# Patient Record
Sex: MALE | Race: WHITE | Employment: FULL TIME | URBAN - METROPOLITAN AREA
[De-identification: names, ages, dates, MRNs, and addresses within clinical notes are randomized per-mention and may not be internally consistent; named-entity substitution may affect disease eponyms.]

---

## 2018-06-12 ENCOUNTER — OFFICE VISIT (OUTPATIENT)
Dept: ORTHOPEDIC SURGERY | Age: 38
End: 2018-06-12

## 2018-06-12 VITALS — HEIGHT: 71 IN | BODY MASS INDEX: 23.09 KG/M2 | WEIGHT: 164.9 LBS
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
## 2018-06-12 DIAGNOSIS — M79.644 THUMB PAIN, RIGHT: ICD-10-CM

## 2018-06-12 PROCEDURE — 99213 OFFICE O/P EST LOW 20 MIN: CPT | Performed by: ORTHOPAEDIC SURGERY

## 2019-11-09 ENCOUNTER — APPOINTMENT (OUTPATIENT)
Dept: CT IMAGING | Age: 39
DRG: 390 | End: 2019-11-09
Payer: COMMERCIAL

## 2019-11-09 ENCOUNTER — HOSPITAL ENCOUNTER (INPATIENT)
Age: 39
LOS: 1 days | Discharge: HOME | DRG: 390 | End: 2019-11-10
Attending: EMERGENCY MEDICINE | Admitting: SURGERY
Payer: COMMERCIAL

## 2019-11-09 DIAGNOSIS — K56.609 SMALL BOWEL OBSTRUCTION (HCC): Primary | ICD-10-CM

## 2019-11-09 LAB
A/G RATIO: 1.4 (ref 1.1–2.2)
ALBUMIN SERPL-MCNC: 4.7 G/DL (ref 3.4–5)
ALP BLD-CCNC: 59 U/L (ref 40–129)
ALT SERPL-CCNC: 26 U/L (ref 10–40)
ANION GAP SERPL CALCULATED.3IONS-SCNC: 13 MMOL/L (ref 3–16)
AST SERPL-CCNC: 25 U/L (ref 15–37)
BASOPHILS ABSOLUTE: 0 K/UL (ref 0–0.2)
BASOPHILS RELATIVE PERCENT: 0.6 %
BILIRUB SERPL-MCNC: 0.6 MG/DL (ref 0–1)
BILIRUBIN URINE: NEGATIVE
BLOOD, URINE: NEGATIVE
BUN BLDV-MCNC: 10 MG/DL (ref 7–20)
CALCIUM SERPL-MCNC: 10.3 MG/DL (ref 8.3–10.6)
CHLORIDE BLD-SCNC: 98 MMOL/L (ref 99–110)
CLARITY: CLEAR
CO2: 26 MMOL/L (ref 21–32)
COLOR: YELLOW
CREAT SERPL-MCNC: 1.2 MG/DL (ref 0.9–1.3)
EOSINOPHILS ABSOLUTE: 0 K/UL (ref 0–0.6)
EOSINOPHILS RELATIVE PERCENT: 0.2 %
GFR AFRICAN AMERICAN: >60
GFR NON-AFRICAN AMERICAN: >60
GLOBULIN: 3.4 G/DL
GLUCOSE BLD-MCNC: 99 MG/DL (ref 70–99)
GLUCOSE URINE: NEGATIVE MG/DL
HCT VFR BLD CALC: 46.3 % (ref 40.5–52.5)
HEMOGLOBIN: 15.5 G/DL (ref 13.5–17.5)
KETONES, URINE: NEGATIVE MG/DL
LEUKOCYTE ESTERASE, URINE: NEGATIVE
LIPASE: 39 U/L (ref 13–60)
LYMPHOCYTES ABSOLUTE: 1.1 K/UL (ref 1–5.1)
LYMPHOCYTES RELATIVE PERCENT: 15.9 %
MCH RBC QN AUTO: 31.5 PG (ref 26–34)
MCHC RBC AUTO-ENTMCNC: 33.6 G/DL (ref 31–36)
MCV RBC AUTO: 93.8 FL (ref 80–100)
MICROSCOPIC EXAMINATION: NORMAL
MONOCYTES ABSOLUTE: 0.6 K/UL (ref 0–1.3)
MONOCYTES RELATIVE PERCENT: 8.6 %
NEUTROPHILS ABSOLUTE: 5.3 K/UL (ref 1.7–7.7)
NEUTROPHILS RELATIVE PERCENT: 74.7 %
NITRITE, URINE: NEGATIVE
PDW BLD-RTO: 13.4 % (ref 12.4–15.4)
PH UA: 7 (ref 5–8)
PLATELET # BLD: 161 K/UL (ref 135–450)
PMV BLD AUTO: 8.7 FL (ref 5–10.5)
POTASSIUM SERPL-SCNC: 4.3 MMOL/L (ref 3.5–5.1)
PROTEIN UA: NEGATIVE MG/DL
RBC # BLD: 4.94 M/UL (ref 4.2–5.9)
SODIUM BLD-SCNC: 137 MMOL/L (ref 136–145)
SPECIFIC GRAVITY UA: <=1.005 (ref 1–1.03)
TOTAL PROTEIN: 8.1 G/DL (ref 6.4–8.2)
URINE REFLEX TO CULTURE: NORMAL
URINE TYPE: NORMAL
UROBILINOGEN, URINE: 0.2 E.U./DL
WBC # BLD: 7.1 K/UL (ref 4–11)

## 2019-11-09 PROCEDURE — 74177 CT ABD & PELVIS W/CONTRAST: CPT

## 2019-11-09 PROCEDURE — 6360000002 HC RX W HCPCS: Performed by: PHYSICIAN ASSISTANT

## 2019-11-09 PROCEDURE — 85025 COMPLETE CBC W/AUTO DIFF WBC: CPT

## 2019-11-09 PROCEDURE — 96361 HYDRATE IV INFUSION ADD-ON: CPT

## 2019-11-09 PROCEDURE — 6360000004 HC RX CONTRAST MEDICATION: Performed by: EMERGENCY MEDICINE

## 2019-11-09 PROCEDURE — 96375 TX/PRO/DX INJ NEW DRUG ADDON: CPT

## 2019-11-09 PROCEDURE — 96365 THER/PROPH/DIAG IV INF INIT: CPT

## 2019-11-09 PROCEDURE — 80053 COMPREHEN METABOLIC PANEL: CPT

## 2019-11-09 PROCEDURE — 99285 EMERGENCY DEPT VISIT HI MDM: CPT

## 2019-11-09 PROCEDURE — 81003 URINALYSIS AUTO W/O SCOPE: CPT

## 2019-11-09 PROCEDURE — 1200000000 HC SEMI PRIVATE

## 2019-11-09 PROCEDURE — 2580000003 HC RX 258: Performed by: PHYSICIAN ASSISTANT

## 2019-11-09 PROCEDURE — 2580000003 HC RX 258: Performed by: SURGERY

## 2019-11-09 PROCEDURE — 83690 ASSAY OF LIPASE: CPT

## 2019-11-09 RX ORDER — SODIUM CHLORIDE 9 MG/ML
INJECTION, SOLUTION INTRAVENOUS CONTINUOUS
Status: DISCONTINUED | OUTPATIENT
Start: 2019-11-09 | End: 2019-11-10

## 2019-11-09 RX ORDER — SODIUM CHLORIDE 0.9 % (FLUSH) 0.9 %
10 SYRINGE (ML) INJECTION PRN
Status: DISCONTINUED | OUTPATIENT
Start: 2019-11-09 | End: 2019-11-10 | Stop reason: HOSPADM

## 2019-11-09 RX ORDER — SODIUM CHLORIDE 9 MG/ML
1000 INJECTION, SOLUTION INTRAVENOUS CONTINUOUS
Status: DISCONTINUED | OUTPATIENT
Start: 2019-11-09 | End: 2019-11-10 | Stop reason: HOSPADM

## 2019-11-09 RX ORDER — LORAZEPAM 2 MG/ML
0.5 INJECTION INTRAMUSCULAR ONCE
Status: COMPLETED | OUTPATIENT
Start: 2019-11-09 | End: 2019-11-09

## 2019-11-09 RX ORDER — LIDOCAINE HYDROCHLORIDE 20 MG/ML
15 SOLUTION OROPHARYNGEAL ONCE
Status: DISCONTINUED | OUTPATIENT
Start: 2019-11-09 | End: 2019-11-10 | Stop reason: HOSPADM

## 2019-11-09 RX ORDER — ONDANSETRON 2 MG/ML
4 INJECTION INTRAMUSCULAR; INTRAVENOUS EVERY 6 HOURS PRN
Status: DISCONTINUED | OUTPATIENT
Start: 2019-11-09 | End: 2019-11-10 | Stop reason: HOSPADM

## 2019-11-09 RX ORDER — SODIUM CHLORIDE 0.9 % (FLUSH) 0.9 %
10 SYRINGE (ML) INJECTION EVERY 12 HOURS SCHEDULED
Status: DISCONTINUED | OUTPATIENT
Start: 2019-11-09 | End: 2019-11-10 | Stop reason: HOSPADM

## 2019-11-09 RX ORDER — 0.9 % SODIUM CHLORIDE 0.9 %
1000 INTRAVENOUS SOLUTION INTRAVENOUS ONCE
Status: COMPLETED | OUTPATIENT
Start: 2019-11-09 | End: 2019-11-09

## 2019-11-09 RX ADMIN — LORAZEPAM 0.5 MG: 2 INJECTION INTRAMUSCULAR; INTRAVENOUS at 12:21

## 2019-11-09 RX ADMIN — SODIUM CHLORIDE: 9 INJECTION, SOLUTION INTRAVENOUS at 19:22

## 2019-11-09 RX ADMIN — IOPAMIDOL 75 ML: 755 INJECTION, SOLUTION INTRAVENOUS at 11:31

## 2019-11-09 RX ADMIN — SODIUM CHLORIDE 1000 ML: 9 INJECTION, SOLUTION INTRAVENOUS at 12:22

## 2019-11-09 RX ADMIN — SODIUM CHLORIDE 1000 ML: 9 INJECTION, SOLUTION INTRAVENOUS at 10:46

## 2019-11-09 ASSESSMENT — PAIN DESCRIPTION - ONSET: ONSET: ON-GOING

## 2019-11-09 ASSESSMENT — PAIN DESCRIPTION - FREQUENCY: FREQUENCY: CONTINUOUS

## 2019-11-09 ASSESSMENT — PAIN DESCRIPTION - PAIN TYPE: TYPE: ACUTE PAIN

## 2019-11-09 ASSESSMENT — PAIN SCALES - GENERAL
PAINLEVEL_OUTOF10: 0
PAINLEVEL_OUTOF10: 5

## 2019-11-09 ASSESSMENT — PAIN DESCRIPTION - LOCATION: LOCATION: ABDOMEN

## 2019-11-10 ENCOUNTER — APPOINTMENT (OUTPATIENT)
Dept: GENERAL RADIOLOGY | Age: 39
DRG: 390 | End: 2019-11-10
Payer: COMMERCIAL

## 2019-11-10 VITALS
WEIGHT: 180 LBS | RESPIRATION RATE: 16 BRPM | BODY MASS INDEX: 24.38 KG/M2 | HEART RATE: 83 BPM | HEIGHT: 72 IN | OXYGEN SATURATION: 100 % | DIASTOLIC BLOOD PRESSURE: 73 MMHG | SYSTOLIC BLOOD PRESSURE: 124 MMHG | TEMPERATURE: 98.4 F

## 2019-11-10 PROBLEM — K56.609 SBO (SMALL BOWEL OBSTRUCTION) (HCC): Status: RESOLVED | Noted: 2019-11-09 | Resolved: 2019-11-10

## 2019-11-10 LAB
ANION GAP SERPL CALCULATED.3IONS-SCNC: 12 MMOL/L (ref 3–16)
BASOPHILS ABSOLUTE: 0 K/UL (ref 0–0.2)
BASOPHILS RELATIVE PERCENT: 0.6 %
BUN BLDV-MCNC: 8 MG/DL (ref 7–20)
CALCIUM SERPL-MCNC: 9.3 MG/DL (ref 8.3–10.6)
CHLORIDE BLD-SCNC: 103 MMOL/L (ref 99–110)
CO2: 25 MMOL/L (ref 21–32)
CREAT SERPL-MCNC: 1.2 MG/DL (ref 0.9–1.3)
EOSINOPHILS ABSOLUTE: 0 K/UL (ref 0–0.6)
EOSINOPHILS RELATIVE PERCENT: 0.6 %
GFR AFRICAN AMERICAN: >60
GFR NON-AFRICAN AMERICAN: >60
GLUCOSE BLD-MCNC: 96 MG/DL (ref 70–99)
HCT VFR BLD CALC: 43 % (ref 40.5–52.5)
HEMOGLOBIN: 14.1 G/DL (ref 13.5–17.5)
LYMPHOCYTES ABSOLUTE: 1.6 K/UL (ref 1–5.1)
LYMPHOCYTES RELATIVE PERCENT: 29.8 %
MCH RBC QN AUTO: 31.2 PG (ref 26–34)
MCHC RBC AUTO-ENTMCNC: 32.8 G/DL (ref 31–36)
MCV RBC AUTO: 95.1 FL (ref 80–100)
MONOCYTES ABSOLUTE: 0.5 K/UL (ref 0–1.3)
MONOCYTES RELATIVE PERCENT: 10.5 %
NEUTROPHILS ABSOLUTE: 3 K/UL (ref 1.7–7.7)
NEUTROPHILS RELATIVE PERCENT: 58.5 %
PDW BLD-RTO: 13.4 % (ref 12.4–15.4)
PLATELET # BLD: 146 K/UL (ref 135–450)
PMV BLD AUTO: 9.1 FL (ref 5–10.5)
POTASSIUM SERPL-SCNC: 4.1 MMOL/L (ref 3.5–5.1)
RBC # BLD: 4.52 M/UL (ref 4.2–5.9)
SODIUM BLD-SCNC: 140 MMOL/L (ref 136–145)
WBC # BLD: 5.2 K/UL (ref 4–11)

## 2019-11-10 PROCEDURE — 99223 1ST HOSP IP/OBS HIGH 75: CPT | Performed by: SURGERY

## 2019-11-10 PROCEDURE — 36415 COLL VENOUS BLD VENIPUNCTURE: CPT

## 2019-11-10 PROCEDURE — 80048 BASIC METABOLIC PNL TOTAL CA: CPT

## 2019-11-10 PROCEDURE — 85025 COMPLETE CBC W/AUTO DIFF WBC: CPT

## 2019-11-10 PROCEDURE — 74019 RADEX ABDOMEN 2 VIEWS: CPT

## 2019-11-10 ASSESSMENT — PAIN SCALES - GENERAL: PAINLEVEL_OUTOF10: 0

## 2020-04-16 ENCOUNTER — HOSPITAL ENCOUNTER (INPATIENT)
Age: 40
LOS: 3 days | Discharge: HOME | DRG: 390 | End: 2020-04-20
Attending: EMERGENCY MEDICINE | Admitting: SURGERY
Payer: COMMERCIAL

## 2020-04-16 LAB
A/G RATIO: 1.7 (ref 1.1–2.2)
ALBUMIN SERPL-MCNC: 4.8 G/DL (ref 3.4–5)
ALP BLD-CCNC: 61 U/L (ref 40–129)
ALT SERPL-CCNC: 19 U/L (ref 10–40)
ANION GAP SERPL CALCULATED.3IONS-SCNC: 14 MMOL/L (ref 3–16)
AST SERPL-CCNC: 31 U/L (ref 15–37)
BASOPHILS ABSOLUTE: 0 K/UL (ref 0–0.2)
BASOPHILS RELATIVE PERCENT: 0.5 %
BILIRUB SERPL-MCNC: 0.6 MG/DL (ref 0–1)
BUN BLDV-MCNC: 15 MG/DL (ref 7–20)
CALCIUM SERPL-MCNC: 10 MG/DL (ref 8.3–10.6)
CHLORIDE BLD-SCNC: 95 MMOL/L (ref 99–110)
CO2: 26 MMOL/L (ref 21–32)
CREAT SERPL-MCNC: 1.2 MG/DL (ref 0.9–1.3)
EOSINOPHILS ABSOLUTE: 0.1 K/UL (ref 0–0.6)
EOSINOPHILS RELATIVE PERCENT: 1.9 %
GFR AFRICAN AMERICAN: >60
GFR NON-AFRICAN AMERICAN: >60
GLOBULIN: 2.9 G/DL
GLUCOSE BLD-MCNC: 92 MG/DL (ref 70–99)
HCT VFR BLD CALC: 44.9 % (ref 40.5–52.5)
HEMOGLOBIN: 15.5 G/DL (ref 13.5–17.5)
LIPASE: 40 U/L (ref 13–60)
LYMPHOCYTES ABSOLUTE: 2.2 K/UL (ref 1–5.1)
LYMPHOCYTES RELATIVE PERCENT: 38.2 %
MCH RBC QN AUTO: 31.7 PG (ref 26–34)
MCHC RBC AUTO-ENTMCNC: 34.6 G/DL (ref 31–36)
MCV RBC AUTO: 91.8 FL (ref 80–100)
MONOCYTES ABSOLUTE: 0.6 K/UL (ref 0–1.3)
MONOCYTES RELATIVE PERCENT: 11.1 %
NEUTROPHILS ABSOLUTE: 2.7 K/UL (ref 1.7–7.7)
NEUTROPHILS RELATIVE PERCENT: 48.3 %
PDW BLD-RTO: 12.6 % (ref 12.4–15.4)
PLATELET # BLD: 151 K/UL (ref 135–450)
PMV BLD AUTO: 9.5 FL (ref 5–10.5)
POTASSIUM SERPL-SCNC: 3.7 MMOL/L (ref 3.5–5.1)
RBC # BLD: 4.89 M/UL (ref 4.2–5.9)
SODIUM BLD-SCNC: 135 MMOL/L (ref 136–145)
TOTAL PROTEIN: 7.7 G/DL (ref 6.4–8.2)
WBC # BLD: 5.6 K/UL (ref 4–11)

## 2020-04-16 PROCEDURE — 2580000003 HC RX 258: Performed by: PHYSICIAN ASSISTANT

## 2020-04-16 PROCEDURE — 80053 COMPREHEN METABOLIC PANEL: CPT

## 2020-04-16 PROCEDURE — 96374 THER/PROPH/DIAG INJ IV PUSH: CPT

## 2020-04-16 PROCEDURE — 83690 ASSAY OF LIPASE: CPT

## 2020-04-16 PROCEDURE — 6360000002 HC RX W HCPCS: Performed by: PHYSICIAN ASSISTANT

## 2020-04-16 PROCEDURE — 85025 COMPLETE CBC W/AUTO DIFF WBC: CPT

## 2020-04-16 PROCEDURE — 99285 EMERGENCY DEPT VISIT HI MDM: CPT

## 2020-04-16 RX ORDER — ONDANSETRON 2 MG/ML
4 INJECTION INTRAMUSCULAR; INTRAVENOUS ONCE
Status: DISCONTINUED | OUTPATIENT
Start: 2020-04-16 | End: 2020-04-17

## 2020-04-16 RX ORDER — 0.9 % SODIUM CHLORIDE 0.9 %
1000 INTRAVENOUS SOLUTION INTRAVENOUS ONCE
Status: COMPLETED | OUTPATIENT
Start: 2020-04-16 | End: 2020-04-17

## 2020-04-16 RX ADMIN — SODIUM CHLORIDE 1000 ML: 9 INJECTION, SOLUTION INTRAVENOUS at 23:18

## 2020-04-16 ASSESSMENT — PAIN DESCRIPTION - LOCATION: LOCATION: ABDOMEN

## 2020-04-16 ASSESSMENT — PAIN DESCRIPTION - PAIN TYPE: TYPE: ACUTE PAIN

## 2020-04-16 ASSESSMENT — PAIN SCALES - GENERAL: PAINLEVEL_OUTOF10: 8

## 2020-04-17 ENCOUNTER — APPOINTMENT (OUTPATIENT)
Dept: GENERAL RADIOLOGY | Age: 40
DRG: 390 | End: 2020-04-17
Payer: COMMERCIAL

## 2020-04-17 ENCOUNTER — APPOINTMENT (OUTPATIENT)
Dept: CT IMAGING | Age: 40
DRG: 390 | End: 2020-04-17
Payer: COMMERCIAL

## 2020-04-17 PROBLEM — K56.609 SBO (SMALL BOWEL OBSTRUCTION) (HCC): Status: ACTIVE | Noted: 2020-04-17

## 2020-04-17 LAB
BILIRUBIN URINE: NEGATIVE
BLOOD, URINE: NEGATIVE
CLARITY: CLEAR
COLOR: YELLOW
GLUCOSE URINE: NEGATIVE MG/DL
KETONES, URINE: 15 MG/DL
LEUKOCYTE ESTERASE, URINE: NEGATIVE
MICROSCOPIC EXAMINATION: ABNORMAL
NITRITE, URINE: NEGATIVE
PH UA: 7 (ref 5–8)
PROTEIN UA: NEGATIVE MG/DL
SPECIFIC GRAVITY UA: 1.01 (ref 1–1.03)
URINE REFLEX TO CULTURE: ABNORMAL
URINE TYPE: ABNORMAL
UROBILINOGEN, URINE: 0.2 E.U./DL

## 2020-04-17 PROCEDURE — 74177 CT ABD & PELVIS W/CONTRAST: CPT

## 2020-04-17 PROCEDURE — 99222 1ST HOSP IP/OBS MODERATE 55: CPT | Performed by: SURGERY

## 2020-04-17 PROCEDURE — 6360000004 HC RX CONTRAST MEDICATION: Performed by: EMERGENCY MEDICINE

## 2020-04-17 PROCEDURE — 81003 URINALYSIS AUTO W/O SCOPE: CPT

## 2020-04-17 PROCEDURE — 1200000000 HC SEMI PRIVATE

## 2020-04-17 PROCEDURE — 2580000003 HC RX 258: Performed by: SURGERY

## 2020-04-17 PROCEDURE — 2580000003 HC RX 258: Performed by: EMERGENCY MEDICINE

## 2020-04-17 PROCEDURE — 74019 RADEX ABDOMEN 2 VIEWS: CPT

## 2020-04-17 RX ORDER — HEPARIN SODIUM 5000 [USP'U]/ML
5000 INJECTION, SOLUTION INTRAVENOUS; SUBCUTANEOUS EVERY 8 HOURS SCHEDULED
Status: DISCONTINUED | OUTPATIENT
Start: 2020-04-17 | End: 2020-04-20 | Stop reason: HOSPADM

## 2020-04-17 RX ORDER — SODIUM CHLORIDE 9 MG/ML
1000 INJECTION, SOLUTION INTRAVENOUS ONCE
Status: COMPLETED | OUTPATIENT
Start: 2020-04-17 | End: 2020-04-17

## 2020-04-17 RX ORDER — SODIUM CHLORIDE 9 MG/ML
INJECTION, SOLUTION INTRAVENOUS CONTINUOUS
Status: DISCONTINUED | OUTPATIENT
Start: 2020-04-17 | End: 2020-04-20 | Stop reason: HOSPADM

## 2020-04-17 RX ORDER — DIPHENHYDRAMINE HYDROCHLORIDE 50 MG/ML
25 INJECTION INTRAMUSCULAR; INTRAVENOUS EVERY 6 HOURS PRN
Status: DISCONTINUED | OUTPATIENT
Start: 2020-04-17 | End: 2020-04-20 | Stop reason: HOSPADM

## 2020-04-17 RX ORDER — ONDANSETRON 2 MG/ML
4 INJECTION INTRAMUSCULAR; INTRAVENOUS EVERY 4 HOURS PRN
Status: DISCONTINUED | OUTPATIENT
Start: 2020-04-17 | End: 2020-04-20 | Stop reason: HOSPADM

## 2020-04-17 RX ADMIN — SODIUM CHLORIDE 1000 ML: 9 INJECTION, SOLUTION INTRAVENOUS at 00:47

## 2020-04-17 RX ADMIN — IOPAMIDOL 75 ML: 755 INJECTION, SOLUTION INTRAVENOUS at 01:48

## 2020-04-17 RX ADMIN — SODIUM CHLORIDE: 9 INJECTION, SOLUTION INTRAVENOUS at 13:38

## 2020-04-17 RX ADMIN — SODIUM CHLORIDE: 9 INJECTION, SOLUTION INTRAVENOUS at 20:30

## 2020-04-17 RX ADMIN — IOHEXOL 50 ML: 240 INJECTION, SOLUTION INTRATHECAL; INTRAVASCULAR; INTRAVENOUS; ORAL at 00:47

## 2020-04-17 RX ADMIN — SODIUM CHLORIDE: 9 INJECTION, SOLUTION INTRAVENOUS at 05:40

## 2020-04-17 ASSESSMENT — PAIN SCALES - GENERAL: PAINLEVEL_OUTOF10: 0

## 2020-04-17 NOTE — ED PROVIDER NOTES
He has not tried any medicine for his symptoms at home. Nursing Notes were all reviewed and agreed with or any disagreements were addressed  in the HPI. REVIEW OF SYSTEMS    (2-9 systems for level 4, 10 or more for level 5)     Review of Systems    Positives and Pertinent negatives as per HPI. Except as noted abovein the ROS, all other systems were reviewed and negative. PAST MEDICAL HISTORY     Past Medical History:   Diagnosis Date    Bowel obstruction ([address]          SURGICAL HISTORY     Past Surgical History:   Procedure Laterality Date    ABDOMEN SURGERY      SHOULDER ARTHROPLASTY Left 2015    STOMACH SURGERY      TONSILLECTOMY      WISDOM TOOTH EXTRACTION           CURRENTMEDICATIONS       Previous Medications    No medications on file         ALLERGIES     Patient has no known allergies. FAMILYHISTORY     History reviewed. No pertinent family history. SOCIAL HISTORY       Social History     Socioeconomic History    Marital status: Single     Spouse name: None    Number of children: None    Years of education: None    Highest education level: None   Occupational History    None   Social Needs    Financial resource strain: None    Food insecurity     Worry: None     Inability: None    Transportation needs     Medical: None     Non-medical: None   Tobacco Use    Smoking status: Former Smoker     Last attempt to quit: 2013     Years since quittin.3    Smokeless tobacco: Never Used   Substance and Sexual Activity    Alcohol use:  Yes     Alcohol/week: 5.0 standard drinks     Types: 5 Cans of beer per week     Comment: per week    Drug use: No    Sexual activity: None   Lifestyle    Physical activity     Days per week: None     Minutes per session: None    Stress: None   Relationships    Social connections     Talks on phone: None     Gets together: None     Attends Voodoo service: None     Active member of club or organization: None     Attends meetings of clubs or organizations: None     Relationship status: None    Intimate partner violence     Fear of current or ex partner: None     Emotionally abused: None     Physically abused: None     Forced sexual activity: None   Other Topics Concern    None   Social History Narrative    None       SCREENINGS    Marsha Coma Scale  Eye Opening: Spontaneous  Best Verbal Response: Oriented  Best Motor Response: Obeys commands  Marsha Coma Scale Score: 15        PHYSICAL EXAM    (up to 7 for level 4, 8 or more for level 5)     ED Triage Vitals   BP Temp Temp Source Pulse Resp SpO2 Height Weight   04/16/20 2243 04/16/20 2243 04/16/20 2241 04/16/20 2243 04/16/20 2243 04/16/20 2243 04/16/20 2241 04/16/20 2241   (!) 151/97 97.8 °F (36.6 °C) Oral 70 16 100 % 6' (1.829 m) 180 lb (81.6 kg)       Physical Exam  PHYSICAL EXAM  BP (!) 142/92   Pulse 65   Temp 97.8 °F (36.6 °C)   Resp 16   Ht 6' (1.829 m)   Wt 180 lb (81.6 kg)   SpO2 98%   BMI 24.41 kg/m²   GENERAL APPEARANCE: Awake and alert. Cooperative. Adult male lying supine in exam bed. He is nondiaphoretic breathing comfortably on room air showing no sign of acute respiratory distress. HEAD: Normocephalic. Atraumatic. EYES: PERRL. EOM's grossly intact. ENT: Mucous membranes are moist.   NECK: Supple. Trachea midline  HEART: RRR. No murmurs. LUNGS: Respirations unlabored. CTAB. Good air exchange. Speaking comfortably in full sentences. ABDOMEN: Soft, surgical scars noted. He has right lower quadrant tenderness to palpation, no peritoneal signs. Hyperactive bowel sounds noted throughout. Non-distended. Non-tender. No masses. No organomegaly. No guarding or rebound. EXTREMITIES: No peripheral edema. Moves all extremities equally. All extremities neurovascularly intact. SKIN: Warm and dry. No acute rashes. NEUROLOGICAL: Alert and oriented. No gross facial drooping.   Power intact upper and lower extremities, sensation intact x4   pSYCHIATRIC: Normal mood (!) 151/97 (!) 148/96 (!) 142/92   Pulse:  70 68 65   Resp:  16 14 16   Temp:  97.8 °F (36.6 °C)     TempSrc: Oral      SpO2:  100% 98% 98%   Weight: 180 lb (81.6 kg)      Height: 6' (1.829 m)          Patient was given thefollowing medications:  Medications   ondansetron (ZOFRAN) injection 4 mg (0 mg Intravenous Held 4/16/20 2318)   0.9 % sodium chloride bolus (0 mLs Intravenous Stopped 4/17/20 0034)   iopamidol (ISOVUE-370) 76 % injection 75 mL (75 mLs Intravenous Given 4/17/20 0148)   0.9 % sodium chloride infusion (1,000 mLs Intravenous New Bag 4/17/20 0047)   iohexol (OMNIPAQUE 240) injection 50 mL (50 mLs Intravenous Given 4/17/20 0047)       Patient is a 65-year-old male who presents for evaluation of abdominal pain. Patient is significant past medical history of small bowel obstruction. His abdomen is soft however he does have some right lower quadrant tenderness to palpation no other peritoneal signs. He does have hyperactive bowel sounds. Labs reviewed, no significant acute abnormality. CT abdomen pelvis with p.o. and IV contrast pending at time of signout. IV fluids, antiemetic initiated. 1:58 AM: I discussed the history, physical, and treatment plan with Dr. Liliana Orlando. Pearl Nugent was signed out in stable condition. Please see Dr. Naresh SIMMONS's note for further details, including diagnosis and disposition. FINAL IMPRESSION      1. Right lower quadrant abdominal pain          DISPOSITION/PLAN   DISPOSITION        PATIENT REFERREDTO:  No follow-up provider specified.     DISCHARGE MEDICATIONS:  New Prescriptions    No medications on file       DISCONTINUED MEDICATIONS:  Discontinued Medications    No medications on file              (Please note that portions ofthis note were completed with a voice recognition program.  Efforts were made to edit the dictations but occasionally words are mis-transcribed.)    DAYDAY Rodriges (electronically signed)       Jefry Lo

## 2020-04-17 NOTE — ED NOTES
In to talk with pt at this time. Stated that the on-call surgeon had put in an order for NG tube placement. Pt states that he does not want the NG tube at this time because he is not having n/v/. He states that if it begins to get worse, he will allow NG tube placement but states \"I do not see the need for it at the moment\".      Thom Jon RN  04/17/20 9924

## 2020-04-17 NOTE — CARE COORDINATION
Review of chart for any potential discharge needs. No needs identified for discharge intervention at this time. MD and bedside RN  if needs arise please consult case management for discharge intervention. CM not following at this time.

## 2020-04-17 NOTE — H&P
Surgery History and Physical  Idalia Fabiana Valentine, CNP  Pt Name: Mahad Smith  MRN: [id number]  YOB: 1980  Date of evaluation: 4/17/2020  Primary Care Physician: Mara Salmeron MD  Patient evaluated at the request of  Dr. Ari Navarrete  Reason for evaluation: SBO  IMPRESSIONS:   1. F/U abd xray this am: contrast in the colon, dilated mid/lower loops of SB focal ileus vs PSBO  2. CT abd and pelvis: SBO with transition point involving the distal ileum  3. ABD: soft, no distention, no N/V, + flatus, no BM  4. Labs unremarkable  5. VSS  6. Active Problems:  7. SBO (small bowel obstruction) ([address]  8. Resolved Problems:  9.   * No resolved hospital problems. *  10. PLANS:   1. Admit type: Inpatient  2. It is expected this patient's LOS will be: Less than 2 midnights  3. Anticipated Disposition Upon Discharge: Home  4. Analgesics and antiemetics on a prn basis  5. Empiric antibiotic coverage  6. DVT prophylaxis with heparin sub q  7. Clear liquids  8. If patient tolerates clear liquids and pain remains resolved the patient may be able to be discharged in the am.    SUBJECTIVE:     History of Chief Complaint:    Mahad Smith is a 42 y.o. male who presented to the ER with severe right lower quadrant abdominal pain. Yesterday morning the patient felt fine. 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 He ate lunch without any problems. The patient ate dinner at 2030. The patient states he ate a 1 pound pork. He reports feeling \"very full. \" He tried to go to sleep and developed severe epigastric pain. The pain moved to his right lower quadrant. He was nauseated. He did not vomit. He denied fever or chills. He has had bowel obstructions in the past. He states this felt \"exactly the same\" as previous bowel obstructions. A CT was performed in the ER and demonstrated the above findings. We admitted the patient for nasogastric decompression, intravenous fluids and serial abdominal examinations.      Pt has been admitted in the past for BHASKAR which have resolved without surgical intervention:     11/10/19: SBO resolved with conservative treatment. 7/21/14: SBO resolved with conservative tx  2015 x2: BHASKAR resolved with conservative treatment  ABD surgery history:   Intussusception at 1 months old with ostomy and takedown later. ISRRAEL surgery at age [de-identified]      Past Medical History   has a past medical history of Bowel obstruction ([address] Past Surgical History   has a past surgical history that includes Stomach surgery; Abdomen surgery; [place] tooth extraction; Tonsillectomy; and Total shoulder arthroplasty (Left, 2015). Medications  Prior to Admission medications    Not on File    Scheduled Meds:   heparin (porcine)  5,000 Units Subcutaneous 3 times per day     Continuous Infusions:   sodium chloride 125 mL/hr at 04/17/20 1338     PRN Meds:. HYDROmorphone, ondansetron, diphenhydrAMINE, phenol    Allergies  has No Known Allergies. Family History  family history is not on file. Social History   reports that he quit smoking about 6 years ago. He has never used smokeless tobacco. He reports current alcohol use of about 5.0 standard drinks of alcohol per week. He reports that he does not use drugs. Review of Systems:  General Denies any fever or chills  HEENT Denies any diplopia, tinnitus or vertigo  Resp Denies any shortness of breath, cough or wheezing  Cardiac Denies any chest pain, palpitations, claudication or edema  GI Positive for nausea and RLQ abdominal pain  Denies any melena, hematochezia, hematemesis or pyrosis   Denies any frequency, urgency, hesitancy or incontinence  Heme Denies bruising or bleeding easily  Endocrine Denies any history of diabetes or thyroid disease  Neuro Denies any focal motor or sensory deficits    OBJECTIVE:   VITALS:  height is 6' (1.829 m) and weight is 188 lb 9.6 oz (85.5 kg). His oral temperature is 97.8 °F (36.6 °C). His blood pressure is 126/82 and his pulse is 68.  His respiration is 14 and

## 2020-04-17 NOTE — FLOWSHEET NOTE
04/17/20 0945   Vital Signs   Temp 98.2 °F (36.8 °C)   Temp Source Oral   Pulse 66   Resp 14   /75   BP Location Right upper arm   BP Upper/Lower Upper   Patient Position Supine   Level of Consciousness 0   MEWS Score 0   Patient Currently in Pain Denies   Oxygen Therapy   SpO2 100 %   O2 Device None (Room air)     Patient went for repeat abdominal xray this morning. It still shows the partial small bowel obstruction. Patient is relaxed in bed denies any nausea or pain. Per pt he is passing gas. Pt was also able to have bowel movement early in the morning today. Patient is independent and walks steady with no complications. Remains with Iv fluids and Npo. Call light in reach.

## 2020-04-18 ENCOUNTER — APPOINTMENT (OUTPATIENT)
Dept: GENERAL RADIOLOGY | Age: 40
DRG: 390 | End: 2020-04-18
Payer: COMMERCIAL

## 2020-04-18 PROCEDURE — 74019 RADEX ABDOMEN 2 VIEWS: CPT

## 2020-04-18 PROCEDURE — 2580000003 HC RX 258: Performed by: SURGERY

## 2020-04-18 PROCEDURE — 99232 SBSQ HOSP IP/OBS MODERATE 35: CPT | Performed by: SURGERY

## 2020-04-18 PROCEDURE — 1200000000 HC SEMI PRIVATE

## 2020-04-18 PROCEDURE — 6370000000 HC RX 637 (ALT 250 FOR IP): Performed by: SURGERY

## 2020-04-18 PROCEDURE — 71045 X-RAY EXAM CHEST 1 VIEW: CPT

## 2020-04-18 PROCEDURE — 6360000002 HC RX W HCPCS: Performed by: SURGERY

## 2020-04-18 RX ORDER — LORAZEPAM 2 MG/ML
1 INJECTION INTRAMUSCULAR ONCE
Status: COMPLETED | OUTPATIENT
Start: 2020-04-18 | End: 2020-04-18

## 2020-04-18 RX ORDER — PSEUDOEPHEDRINE HYDROCHLORIDE 30 MG/1
60 TABLET ORAL EVERY 4 HOURS PRN
Status: DISCONTINUED | OUTPATIENT
Start: 2020-04-18 | End: 2020-04-20 | Stop reason: HOSPADM

## 2020-04-18 RX ORDER — ACETAMINOPHEN 325 MG/1
650 TABLET ORAL EVERY 6 HOURS PRN
Status: DISCONTINUED | OUTPATIENT
Start: 2020-04-18 | End: 2020-04-20 | Stop reason: HOSPADM

## 2020-04-18 RX ADMIN — SODIUM CHLORIDE: 9 INJECTION, SOLUTION INTRAVENOUS at 12:10

## 2020-04-18 RX ADMIN — SODIUM CHLORIDE: 9 INJECTION, SOLUTION INTRAVENOUS at 20:16

## 2020-04-18 RX ADMIN — SODIUM CHLORIDE: 9 INJECTION, SOLUTION INTRAVENOUS at 04:19

## 2020-04-18 RX ADMIN — LORAZEPAM 1 MG: 2 INJECTION INTRAMUSCULAR; INTRAVENOUS at 15:57

## 2020-04-18 RX ADMIN — PSEUDOEPHEDRINE HCL 60 MG: 30 TABLET, FILM COATED ORAL at 14:20

## 2020-04-18 RX ADMIN — ACETAMINOPHEN 650 MG: 325 TABLET ORAL at 05:59

## 2020-04-18 RX ADMIN — SODIUM CHLORIDE: 9 INJECTION, SOLUTION INTRAVENOUS at 09:51

## 2020-04-18 ASSESSMENT — PAIN SCALES - GENERAL
PAINLEVEL_OUTOF10: 0
PAINLEVEL_OUTOF10: 2

## 2020-04-18 ASSESSMENT — PAIN DESCRIPTION - LOCATION: LOCATION: HEAD

## 2020-04-18 ASSESSMENT — PAIN DESCRIPTION - PAIN TYPE: TYPE: ACUTE PAIN

## 2020-04-18 NOTE — PLAN OF CARE
Problem: Activity:  Goal: Risk for activity intolerance will decrease  Description: Risk for activity intolerance will decrease  4/17/2020 1327 by Kaylee Lau RN  Outcome: Met This Shift     Problem:  Bowel/Gastric:  Goal: Bowel function will improve  Description: Bowel function will improve  4/18/2020 0112 by Madeline Tobar RN  Outcome: Ongoing  4/17/2020 1327 by Kaylee Lau RN  Outcome: Ongoing  Goal: Diagnostic test results will improve  Description: Diagnostic test results will improve  4/18/2020 0112 by Madeline Tobar RN  Outcome: Ongoing  4/17/2020 1327 by Kaylee Lau RN  Outcome: Ongoing  Goal: Occurrences of nausea will decrease  Description: Occurrences of nausea will decrease  4/18/2020 0112 by Madeline Tobar RN  Outcome: Ongoing  4/17/2020 1327 by Kaylee Lau RN  Outcome: Met This Shift  Goal: Occurrences of vomiting will decrease  Description: Occurrences of vomiting will decrease  4/18/2020 0112 by Madeline Tobar RN  Outcome: Ongoing  4/17/2020 1327 by Kaylee Lau RN  Outcome: Met This Shift     Problem: Fluid Volume:  Goal: Maintenance of adequate hydration will improve  Description: Maintenance of adequate hydration will improve  4/17/2020 1327 by Kaylee Lau RN  Outcome: Ongoing

## 2020-04-18 NOTE — PROGRESS NOTES
One time dose of ativan was given to patient. Pt received the 1mg of ativan as was ordered. While doing the partial waste prior to administering to patient accidentally discarded the vial to scan. emi VALDERRAMA was present and witnessed the waste as well as accidentally throwing away the vial and witnessed the administration of the 1mg of ativan. JANNIE Domínguez Verifies that waste was done and vial discarded. Bar code unavailable at time of dose being administered.

## 2020-04-19 ENCOUNTER — APPOINTMENT (OUTPATIENT)
Dept: GENERAL RADIOLOGY | Age: 40
DRG: 390 | End: 2020-04-19
Payer: COMMERCIAL

## 2020-04-19 LAB
ANION GAP SERPL CALCULATED.3IONS-SCNC: 16 MMOL/L (ref 3–16)
BASOPHILS ABSOLUTE: 0 K/UL (ref 0–0.2)
BASOPHILS RELATIVE PERCENT: 0.3 %
BUN BLDV-MCNC: 6 MG/DL (ref 7–20)
CALCIUM SERPL-MCNC: 9.1 MG/DL (ref 8.3–10.6)
CHLORIDE BLD-SCNC: 103 MMOL/L (ref 99–110)
CO2: 21 MMOL/L (ref 21–32)
CREAT SERPL-MCNC: 1 MG/DL (ref 0.9–1.3)
EOSINOPHILS ABSOLUTE: 0.1 K/UL (ref 0–0.6)
EOSINOPHILS RELATIVE PERCENT: 1.1 %
GFR AFRICAN AMERICAN: >60
GFR NON-AFRICAN AMERICAN: >60
GLUCOSE BLD-MCNC: 77 MG/DL (ref 70–99)
HCT VFR BLD CALC: 42.9 % (ref 40.5–52.5)
HEMOGLOBIN: 14.3 G/DL (ref 13.5–17.5)
LYMPHOCYTES ABSOLUTE: 1.7 K/UL (ref 1–5.1)
LYMPHOCYTES RELATIVE PERCENT: 30.9 %
MAGNESIUM: 1.9 MG/DL (ref 1.8–2.4)
MCH RBC QN AUTO: 31.3 PG (ref 26–34)
MCHC RBC AUTO-ENTMCNC: 33.5 G/DL (ref 31–36)
MCV RBC AUTO: 93.4 FL (ref 80–100)
MONOCYTES ABSOLUTE: 0.5 K/UL (ref 0–1.3)
MONOCYTES RELATIVE PERCENT: 8.8 %
NEUTROPHILS ABSOLUTE: 3.2 K/UL (ref 1.7–7.7)
NEUTROPHILS RELATIVE PERCENT: 58.9 %
PDW BLD-RTO: 12.6 % (ref 12.4–15.4)
PHOSPHORUS: 3.7 MG/DL (ref 2.5–4.9)
PLATELET # BLD: 137 K/UL (ref 135–450)
PMV BLD AUTO: 9.6 FL (ref 5–10.5)
POTASSIUM SERPL-SCNC: 3.9 MMOL/L (ref 3.5–5.1)
RBC # BLD: 4.59 M/UL (ref 4.2–5.9)
SODIUM BLD-SCNC: 140 MMOL/L (ref 136–145)
WBC # BLD: 5.5 K/UL (ref 4–11)

## 2020-04-19 PROCEDURE — 2580000003 HC RX 258: Performed by: SURGERY

## 2020-04-19 PROCEDURE — 83735 ASSAY OF MAGNESIUM: CPT

## 2020-04-19 PROCEDURE — 6370000000 HC RX 637 (ALT 250 FOR IP): Performed by: SURGERY

## 2020-04-19 PROCEDURE — 74019 RADEX ABDOMEN 2 VIEWS: CPT

## 2020-04-19 PROCEDURE — 99232 SBSQ HOSP IP/OBS MODERATE 35: CPT | Performed by: SURGERY

## 2020-04-19 PROCEDURE — 80048 BASIC METABOLIC PNL TOTAL CA: CPT

## 2020-04-19 PROCEDURE — 85025 COMPLETE CBC W/AUTO DIFF WBC: CPT

## 2020-04-19 PROCEDURE — 84100 ASSAY OF PHOSPHORUS: CPT

## 2020-04-19 PROCEDURE — 36415 COLL VENOUS BLD VENIPUNCTURE: CPT

## 2020-04-19 PROCEDURE — 1200000000 HC SEMI PRIVATE

## 2020-04-19 RX ADMIN — SODIUM CHLORIDE: 9 INJECTION, SOLUTION INTRAVENOUS at 20:50

## 2020-04-19 RX ADMIN — ACETAMINOPHEN 650 MG: 325 TABLET ORAL at 20:57

## 2020-04-19 RX ADMIN — SODIUM CHLORIDE: 9 INJECTION, SOLUTION INTRAVENOUS at 03:11

## 2020-04-19 RX ADMIN — SODIUM CHLORIDE: 9 INJECTION, SOLUTION INTRAVENOUS at 13:45

## 2020-04-19 ASSESSMENT — PAIN SCALES - GENERAL
PAINLEVEL_OUTOF10: 4
PAINLEVEL_OUTOF10: 2

## 2020-04-19 ASSESSMENT — PAIN DESCRIPTION - PAIN TYPE: TYPE: ACUTE PAIN

## 2020-04-19 ASSESSMENT — PAIN DESCRIPTION - LOCATION: LOCATION: HEAD

## 2020-04-19 NOTE — PLAN OF CARE
Problem: Activity:  Goal: Risk for activity intolerance will decrease  Description: Risk for activity intolerance will decrease  Outcome: Ongoing   Pt continues to ambulate in room. Problem:  Bowel/Gastric:  Goal: Bowel function will improve  Description: Bowel function will improve  Outcome: Ongoing   Pt has repeat abd xray today that showed improvement of the SBO  Problem: Fluid Volume:  Goal: Maintenance of adequate hydration will improve  Description: Maintenance of adequate hydration will improve  Outcome: Ongoing   Pt continues to get Iv fluids and started back on clears

## 2020-04-19 NOTE — PROGRESS NOTES
Pts NG has been clamped per dr. Monique Flores orders. Can be reconnected as needed for nausea and vomiting. Patient started on clears. Pt also denied his heparin again stating he is ambulating.

## 2020-04-19 NOTE — FLOWSHEET NOTE
04/19/20 0730   Vital Signs   Temp 97.3 °F (36.3 °C)   Temp Source Oral   Pulse 71   Resp 14   BP (!) 145/76   BP Location Right upper arm   BP Upper/Lower Upper   Patient Position Semi fowlers   Level of Consciousness 0   MEWS Score 0   Patient Currently in Pain Denies   Oxygen Therapy   SpO2 100 %   O2 Device None (Room air)     Patient was up and ambulating in room urinated. Patients NG tube remains to low wall continuous suction. Starting to get more output and turning a brown color from previous clear color. Pt denies any pain this morning. Remains with Iv fluids. Pt will be having repeat abdominal xray today to see if there is any improvement of the SBO. Call light in reach. Pt denied any other needs.

## 2020-04-20 ENCOUNTER — APPOINTMENT (OUTPATIENT)
Dept: GENERAL RADIOLOGY | Age: 40
DRG: 390 | End: 2020-04-20
Payer: COMMERCIAL

## 2020-04-20 VITALS
WEIGHT: 188.6 LBS | BODY MASS INDEX: 25.55 KG/M2 | DIASTOLIC BLOOD PRESSURE: 83 MMHG | HEIGHT: 72 IN | SYSTOLIC BLOOD PRESSURE: 146 MMHG | TEMPERATURE: 98.1 F | OXYGEN SATURATION: 100 % | RESPIRATION RATE: 16 BRPM | HEART RATE: 66 BPM

## 2020-04-20 PROBLEM — K56.609 SBO (SMALL BOWEL OBSTRUCTION) (HCC): Status: RESOLVED | Noted: 2020-04-17 | Resolved: 2020-04-20

## 2020-04-20 PROCEDURE — 74019 RADEX ABDOMEN 2 VIEWS: CPT

## 2020-04-20 PROCEDURE — 6370000000 HC RX 637 (ALT 250 FOR IP): Performed by: NURSE PRACTITIONER

## 2020-04-20 PROCEDURE — 99238 HOSP IP/OBS DSCHRG MGMT 30/<: CPT | Performed by: SURGERY

## 2020-04-20 PROCEDURE — 2580000003 HC RX 258: Performed by: SURGERY

## 2020-04-20 RX ORDER — POLYETHYLENE GLYCOL 3350 17 G/17G
17 POWDER, FOR SOLUTION ORAL ONCE
Status: COMPLETED | OUTPATIENT
Start: 2020-04-20 | End: 2020-04-20

## 2020-04-20 RX ADMIN — SODIUM CHLORIDE: 9 INJECTION, SOLUTION INTRAVENOUS at 12:11

## 2020-04-20 RX ADMIN — POLYETHYLENE GLYCOL 3350 17 G: 17 POWDER, FOR SOLUTION ORAL at 12:49

## 2020-04-20 RX ADMIN — SODIUM CHLORIDE: 9 INJECTION, SOLUTION INTRAVENOUS at 04:30

## 2020-04-20 ASSESSMENT — PAIN SCALES - GENERAL: PAINLEVEL_OUTOF10: 0

## 2020-04-20 NOTE — PROGRESS NOTES
Bedside report given to [facility]  pt in stable condition no needs at this time.  Call light within reach

## 2020-04-20 NOTE — PLAN OF CARE
Problem: Activity:  Goal: Risk for activity intolerance will decrease  Description: Risk for activity intolerance will decrease  4/19/2020 1356 by Martin Latif RN  Outcome: Ongoing     Problem:  Bowel/Gastric:  Goal: Bowel function will improve  Description: Bowel function will improve  4/19/2020 2210 by Adolfo Villegas RN  Outcome: Ongoing  4/19/2020 1356 by Martin Latif RN  Outcome: Ongoing  Goal: Diagnostic test results will improve  Description: Diagnostic test results will improve  Outcome: Ongoing     Problem: Fluid Volume:  Goal: Maintenance of adequate hydration will improve  Description: Maintenance of adequate hydration will improve  4/19/2020 1356 by Martin Latif RN  Outcome: Ongoing

## 2020-04-20 NOTE — PROGRESS NOTES
Pt states that he had a small bowel movement that was unwitnessed by nurse. Pt tolerating food without nausea or pain. Pt wanting to go home.

## 2021-01-30 ENCOUNTER — HOSPITAL ENCOUNTER (INPATIENT)
Age: 41
LOS: 3 days | Discharge: HOME | DRG: 390 | End: 2021-02-02
Attending: EMERGENCY MEDICINE | Admitting: SURGERY
Payer: COMMERCIAL

## 2021-01-30 ENCOUNTER — APPOINTMENT (OUTPATIENT)
Dept: GENERAL RADIOLOGY | Age: 41
DRG: 390 | End: 2021-01-30
Payer: COMMERCIAL

## 2021-01-30 ENCOUNTER — APPOINTMENT (OUTPATIENT)
Dept: CT IMAGING | Age: 41
DRG: 390 | End: 2021-01-30
Payer: COMMERCIAL

## 2021-01-30 DIAGNOSIS — R10.30 LOWER ABDOMINAL PAIN: ICD-10-CM

## 2021-01-30 DIAGNOSIS — K56.609 SBO (SMALL BOWEL OBSTRUCTION) (HCC): Primary | ICD-10-CM

## 2021-01-30 DIAGNOSIS — R11.0 NAUSEA: ICD-10-CM

## 2021-01-30 LAB
A/G RATIO: 1.8 (ref 1.1–2.2)
ALBUMIN SERPL-MCNC: 4.9 G/DL (ref 3.4–5)
ALP BLD-CCNC: 58 U/L (ref 40–129)
ALT SERPL-CCNC: 18 U/L (ref 10–40)
ANION GAP SERPL CALCULATED.3IONS-SCNC: 11 MMOL/L (ref 3–16)
AST SERPL-CCNC: 22 U/L (ref 15–37)
BASOPHILS ABSOLUTE: 0 K/UL (ref 0–0.2)
BASOPHILS RELATIVE PERCENT: 0.5 %
BILIRUB SERPL-MCNC: 0.4 MG/DL (ref 0–1)
BILIRUBIN URINE: NEGATIVE
BLOOD, URINE: NEGATIVE
BUN BLDV-MCNC: 11 MG/DL (ref 7–20)
CALCIUM SERPL-MCNC: 10.3 MG/DL (ref 8.3–10.6)
CHLORIDE BLD-SCNC: 99 MMOL/L (ref 99–110)
CLARITY: CLEAR
CO2: 30 MMOL/L (ref 21–32)
COLOR: YELLOW
CREAT SERPL-MCNC: 1.1 MG/DL (ref 0.9–1.3)
EOSINOPHILS ABSOLUTE: 0.1 K/UL (ref 0–0.6)
EOSINOPHILS RELATIVE PERCENT: 1.5 %
GFR AFRICAN AMERICAN: >60
GFR NON-AFRICAN AMERICAN: >60
GLOBULIN: 2.7 G/DL
GLUCOSE BLD-MCNC: 111 MG/DL (ref 70–99)
GLUCOSE URINE: NEGATIVE MG/DL
HCT VFR BLD CALC: 43.8 % (ref 40.5–52.5)
HEMOGLOBIN: 14.9 G/DL (ref 13.5–17.5)
KETONES, URINE: NEGATIVE MG/DL
LACTIC ACID: 1.4 MMOL/L (ref 0.4–2)
LEUKOCYTE ESTERASE, URINE: NEGATIVE
LIPASE: 33 U/L (ref 13–60)
LYMPHOCYTES ABSOLUTE: 1.7 K/UL (ref 1–5.1)
LYMPHOCYTES RELATIVE PERCENT: 35 %
MCH RBC QN AUTO: 31.2 PG (ref 26–34)
MCHC RBC AUTO-ENTMCNC: 34.1 G/DL (ref 31–36)
MCV RBC AUTO: 91.5 FL (ref 80–100)
MICROSCOPIC EXAMINATION: NORMAL
MONOCYTES ABSOLUTE: 0.3 K/UL (ref 0–1.3)
MONOCYTES RELATIVE PERCENT: 6.8 %
NEUTROPHILS ABSOLUTE: 2.7 K/UL (ref 1.7–7.7)
NEUTROPHILS RELATIVE PERCENT: 56.2 %
NITRITE, URINE: NEGATIVE
PDW BLD-RTO: 12.6 % (ref 12.4–15.4)
PH UA: 8 (ref 5–8)
PLATELET # BLD: 146 K/UL (ref 135–450)
PMV BLD AUTO: 9.6 FL (ref 5–10.5)
POTASSIUM REFLEX MAGNESIUM: 3.9 MMOL/L (ref 3.5–5.1)
PROTEIN UA: NEGATIVE MG/DL
RBC # BLD: 4.78 M/UL (ref 4.2–5.9)
SODIUM BLD-SCNC: 140 MMOL/L (ref 136–145)
SPECIFIC GRAVITY UA: 1.01 (ref 1–1.03)
TOTAL PROTEIN: 7.6 G/DL (ref 6.4–8.2)
URINE REFLEX TO CULTURE: NORMAL
URINE TYPE: NORMAL
UROBILINOGEN, URINE: 0.2 E.U./DL
WBC # BLD: 4.8 K/UL (ref 4–11)

## 2021-01-30 PROCEDURE — 85025 COMPLETE CBC W/AUTO DIFF WBC: CPT

## 2021-01-30 PROCEDURE — 74018 RADEX ABDOMEN 1 VIEW: CPT

## 2021-01-30 PROCEDURE — 74019 RADEX ABDOMEN 2 VIEWS: CPT

## 2021-01-30 PROCEDURE — 6360000002 HC RX W HCPCS: Performed by: EMERGENCY MEDICINE

## 2021-01-30 PROCEDURE — 6370000000 HC RX 637 (ALT 250 FOR IP): Performed by: EMERGENCY MEDICINE

## 2021-01-30 PROCEDURE — 6360000004 HC RX CONTRAST MEDICATION: Performed by: EMERGENCY MEDICINE

## 2021-01-30 PROCEDURE — 2580000003 HC RX 258: Performed by: EMERGENCY MEDICINE

## 2021-01-30 PROCEDURE — 74177 CT ABD & PELVIS W/CONTRAST: CPT

## 2021-01-30 PROCEDURE — 2580000003 HC RX 258: Performed by: SURGERY

## 2021-01-30 PROCEDURE — 83690 ASSAY OF LIPASE: CPT

## 2021-01-30 PROCEDURE — 81003 URINALYSIS AUTO W/O SCOPE: CPT

## 2021-01-30 PROCEDURE — 96374 THER/PROPH/DIAG INJ IV PUSH: CPT

## 2021-01-30 PROCEDURE — 1200000000 HC SEMI PRIVATE

## 2021-01-30 PROCEDURE — 99284 EMERGENCY DEPT VISIT MOD MDM: CPT

## 2021-01-30 PROCEDURE — 83605 ASSAY OF LACTIC ACID: CPT

## 2021-01-30 PROCEDURE — 80053 COMPREHEN METABOLIC PANEL: CPT

## 2021-01-30 PROCEDURE — 96375 TX/PRO/DX INJ NEW DRUG ADDON: CPT

## 2021-01-30 PROCEDURE — 6360000002 HC RX W HCPCS: Performed by: SURGERY

## 2021-01-30 RX ORDER — PROMETHAZINE HYDROCHLORIDE 25 MG/ML
12.5 INJECTION, SOLUTION INTRAMUSCULAR; INTRAVENOUS ONCE
Status: COMPLETED | OUTPATIENT
Start: 2021-01-30 | End: 2021-01-30

## 2021-01-30 RX ORDER — LIDOCAINE HYDROCHLORIDE 20 MG/ML
15 SOLUTION OROPHARYNGEAL ONCE
Status: COMPLETED | OUTPATIENT
Start: 2021-01-30 | End: 2021-01-30

## 2021-01-30 RX ORDER — KETOROLAC TROMETHAMINE 30 MG/ML
15 INJECTION, SOLUTION INTRAMUSCULAR; INTRAVENOUS ONCE
Status: COMPLETED | OUTPATIENT
Start: 2021-01-30 | End: 2021-01-30

## 2021-01-30 RX ORDER — ACETAMINOPHEN 325 MG/1
650 TABLET ORAL EVERY 6 HOURS PRN
Status: DISCONTINUED | OUTPATIENT
Start: 2021-01-30 | End: 2021-02-02 | Stop reason: HOSPADM

## 2021-01-30 RX ORDER — SODIUM CHLORIDE 0.9 % (FLUSH) 0.9 %
10 SYRINGE (ML) INJECTION PRN
Status: DISCONTINUED | OUTPATIENT
Start: 2021-01-30 | End: 2021-02-02 | Stop reason: HOSPADM

## 2021-01-30 RX ORDER — 0.9 % SODIUM CHLORIDE 0.9 %
1000 INTRAVENOUS SOLUTION INTRAVENOUS ONCE
Status: COMPLETED | OUTPATIENT
Start: 2021-01-30 | End: 2021-01-30

## 2021-01-30 RX ORDER — SODIUM CHLORIDE 0.9 % (FLUSH) 0.9 %
10 SYRINGE (ML) INJECTION EVERY 12 HOURS SCHEDULED
Status: DISCONTINUED | OUTPATIENT
Start: 2021-01-30 | End: 2021-02-02 | Stop reason: HOSPADM

## 2021-01-30 RX ORDER — KETOROLAC TROMETHAMINE 30 MG/ML
30 INJECTION, SOLUTION INTRAMUSCULAR; INTRAVENOUS EVERY 6 HOURS PRN
Status: DISCONTINUED | OUTPATIENT
Start: 2021-01-30 | End: 2021-02-02 | Stop reason: HOSPADM

## 2021-01-30 RX ORDER — SODIUM CHLORIDE, SODIUM LACTATE, POTASSIUM CHLORIDE, CALCIUM CHLORIDE 600; 310; 30; 20 MG/100ML; MG/100ML; MG/100ML; MG/100ML
1000 INJECTION, SOLUTION INTRAVENOUS ONCE
Status: COMPLETED | OUTPATIENT
Start: 2021-01-30 | End: 2021-01-30

## 2021-01-30 RX ORDER — ONDANSETRON 2 MG/ML
4 INJECTION INTRAMUSCULAR; INTRAVENOUS ONCE
Status: COMPLETED | OUTPATIENT
Start: 2021-01-30 | End: 2021-01-30

## 2021-01-30 RX ORDER — ONDANSETRON 2 MG/ML
4 INJECTION INTRAMUSCULAR; INTRAVENOUS EVERY 6 HOURS PRN
Status: DISCONTINUED | OUTPATIENT
Start: 2021-01-30 | End: 2021-02-02 | Stop reason: HOSPADM

## 2021-01-30 RX ORDER — MORPHINE SULFATE 4 MG/ML
4 INJECTION, SOLUTION INTRAMUSCULAR; INTRAVENOUS ONCE
Status: COMPLETED | OUTPATIENT
Start: 2021-01-30 | End: 2021-01-30

## 2021-01-30 RX ORDER — PROMETHAZINE HYDROCHLORIDE 25 MG/1
12.5 TABLET ORAL EVERY 6 HOURS PRN
Status: DISCONTINUED | OUTPATIENT
Start: 2021-01-30 | End: 2021-02-02 | Stop reason: HOSPADM

## 2021-01-30 RX ORDER — SODIUM CHLORIDE 9 MG/ML
INJECTION, SOLUTION INTRAVENOUS CONTINUOUS
Status: DISCONTINUED | OUTPATIENT
Start: 2021-01-30 | End: 2021-02-02 | Stop reason: HOSPADM

## 2021-01-30 RX ADMIN — ONDANSETRON HYDROCHLORIDE 4 MG: 2 INJECTION, SOLUTION INTRAMUSCULAR; INTRAVENOUS at 08:24

## 2021-01-30 RX ADMIN — MORPHINE SULFATE 4 MG: 4 INJECTION, SOLUTION INTRAMUSCULAR; INTRAVENOUS at 08:25

## 2021-01-30 RX ADMIN — IOHEXOL 50 ML: 240 INJECTION, SOLUTION INTRATHECAL; INTRAVASCULAR; INTRAVENOUS; ORAL at 05:37

## 2021-01-30 RX ADMIN — SODIUM CHLORIDE: 9 INJECTION, SOLUTION INTRAVENOUS at 11:53

## 2021-01-30 RX ADMIN — LIDOCAINE HYDROCHLORIDE 15 ML: 20 SOLUTION ORAL; TOPICAL at 08:47

## 2021-01-30 RX ADMIN — SODIUM CHLORIDE, POTASSIUM CHLORIDE, SODIUM LACTATE AND CALCIUM CHLORIDE 1000 ML: 600; 310; 30; 20 INJECTION, SOLUTION INTRAVENOUS at 08:56

## 2021-01-30 RX ADMIN — SODIUM CHLORIDE: 9 INJECTION, SOLUTION INTRAVENOUS at 19:01

## 2021-01-30 RX ADMIN — KETOROLAC TROMETHAMINE 30 MG: 30 INJECTION, SOLUTION INTRAMUSCULAR; INTRAVENOUS at 19:45

## 2021-01-30 RX ADMIN — PROMETHAZINE HYDROCHLORIDE 12.5 MG: 25 INJECTION INTRAMUSCULAR; INTRAVENOUS at 04:49

## 2021-01-30 RX ADMIN — KETOROLAC TROMETHAMINE 15 MG: 30 INJECTION, SOLUTION INTRAMUSCULAR at 04:49

## 2021-01-30 RX ADMIN — IOPAMIDOL 75 ML: 755 INJECTION, SOLUTION INTRAVENOUS at 06:52

## 2021-01-30 RX ADMIN — SODIUM CHLORIDE 1000 ML: 9 INJECTION, SOLUTION INTRAVENOUS at 04:50

## 2021-01-30 ASSESSMENT — PAIN DESCRIPTION - ORIENTATION
ORIENTATION: RIGHT;LEFT
ORIENTATION: RIGHT

## 2021-01-30 ASSESSMENT — PAIN DESCRIPTION - LOCATION: LOCATION: ABDOMEN

## 2021-01-30 ASSESSMENT — PAIN DESCRIPTION - PAIN TYPE
TYPE: ACUTE PAIN
TYPE: ACUTE PAIN

## 2021-01-30 ASSESSMENT — PAIN SCALES - GENERAL
PAINLEVEL_OUTOF10: 5
PAINLEVEL_OUTOF10: 7

## 2021-01-30 NOTE — FLOWSHEET NOTE
01/30/21 1121   Vital Signs   Temp 97.8 °F (36.6 °C)   Temp Source Oral   Pulse 76   Heart Rate Source Monitor   Resp 18   /82   Level of Consciousness Alert (0)   MEWS Score 1   Patient Currently in Pain Denies   Height and Weight   Height 6' (1.829 m)   Weight 191 lb (86.6 kg)   BSA (Calculated - sq m) 2.1 sq meters   BMI (Calculated) 26   Oxygen Therapy   SpO2 98 %   O2 Device None (Room air)     Pt a/o. Admitted to 2w from ER. Ng to ws, pt denies any pain or nausea at this time. Call light within reach. Pt refused lovenox, scd's and iv pepcid. Ravi Valenzuela RN\

## 2021-01-30 NOTE — PLAN OF CARE
Problem: Activity:  Goal: Risk for activity intolerance will decrease  Description: Risk for activity intolerance will decrease  Outcome: Ongoing     Problem:  Bowel/Gastric:  Goal: Bowel function will improve  Description: Bowel function will improve  Outcome: Ongoing  Goal: Diagnostic test results will improve  Description: Diagnostic test results will improve  Outcome: Ongoing  Goal: Occurrences of nausea will decrease  Description: Occurrences of nausea will decrease  Outcome: Ongoing  Goal: Occurrences of vomiting will decrease  Description: Occurrences of vomiting will decrease  Outcome: Ongoing     Problem: Fluid Volume:  Goal: Maintenance of adequate hydration will improve  Description: Maintenance of adequate hydration will improve  Outcome: Ongoing     Problem: Health Behavior:  Goal: Ability to state signs and symptoms to report to health care provider will improve  Description: Ability to state signs and symptoms to report to health care provider will improve  Outcome: Ongoing     Problem: Physical Regulation:  Goal: Complications related to the disease process, condition or treatment will be avoided or minimized  Description: Complications related to the disease process, condition or treatment will be avoided or minimized  Outcome: Ongoing  Goal: Ability to maintain clinical measurements within normal limits will improve  Description: Ability to maintain clinical measurements within normal limits will improve  Outcome: Ongoing     Problem: Sensory:  Goal: Ability to identify factors that increase the pain will improve  Description: Ability to identify factors that increase the pain will improve  Outcome: Ongoing  Goal: Ability to notify healthcare provider of pain before it becomes unmanageable or unbearable will improve  Description: Ability to notify healthcare provider of pain before it becomes unmanageable or unbearable will improve  Outcome: Ongoing  Goal: Pain level will decrease Description: Pain level will decrease  Outcome: Ongoing

## 2021-01-30 NOTE — ED PROVIDER NOTES
CHIEF COMPLAINT  Abdominal Pain (pt states he \"thinks he has a bowel obstruction\", states he has hx of bowel obstruction, pt states last formed bm a few hours ago, pt states unable to pass gas and nausea)      HISTORY OF PRESENT ILLNESS  Savanna Bruno is a 47 y.o. male presents to the ED with abdominal pain, states \"I think I have a bowel obstruction again\", has had them before, last was April 2020, the previous before that was November 2019, saw Dr. Terrence Perkins last time, he states he really wants to get surgery this time and get it over with to get this fixed, having nausea but no vomiting, pain to start a couple hours ago, he did just have a bowel movement, which was normal, no melena or hematochezia, no fever, no headache or neck stiffness, no cough or Covid concerns, no dysuria/hematuria or difficulty with urination, no STD concerns or penile discharge, reports having bowel resection as an infant, and appendectomy at 8years old, which was since last abdominal surgery, had not taken anything for pain or nausea prior to arrival, states he had one beer last night, drinks occasionally, denies drug use, no other complaints, modifying factors or associated symptoms. I have reviewed the following from the nursing documentation. Past Medical History:   Diagnosis Date    Bowel obstruction [facility])      Past Surgical History:   Procedure Laterality Date    ABDOMEN SURGERY      SHOULDER ARTHROPLASTY Left 2015    STOMACH SURGERY      TONSILLECTOMY      WISDOM TOOTH EXTRACTION       History reviewed. No pertinent family history.   Social History     Socioeconomic History    Marital status: Single     Spouse name: Not on file    Number of children: Not on file    Years of education: masters degress    Highest education level: Not on file   Occupational History    Not on file   Social Needs    Financial resource strain: Not on file    Food insecurity     Worry: Not on file     Inability: Not on file  Transportation needs     Medical: Not on file     Non-medical: Not on file   Tobacco Use    Smoking status: Former Smoker     Quit date: 2013     Years since quittin.1    Smokeless tobacco: Never Used   Substance and Sexual Activity    Alcohol use: Yes     Alcohol/week: 5.0 standard drinks     Types: 5 Cans of beer per week     Comment: per week    Drug use: No    Sexual activity: Yes     Partners: Female   Lifestyle    Physical activity     Days per week: Not on file     Minutes per session: Not on file    Stress: Not on file   Relationships    Social connections     Talks on phone: Not on file     Gets together: Not on file     Attends Hoahaoism service: Not on file     Active member of club or organization: Not on file     Attends meetings of clubs or organizations: Not on file     Relationship status: Not on file    Intimate partner violence     Fear of current or ex partner: Not on file     Emotionally abused: Not on file     Physically abused: Not on file     Forced sexual activity: Not on file   Other Topics Concern    Not on file   Social History Narrative    Not on file     No current facility-administered medications for this encounter. No current outpatient medications on file. No Known Allergies    REVIEW OF SYSTEMS  10 systems reviewed, pertinent positives per HPI otherwise noted to be negative. PHYSICAL EXAM  /88   Pulse 73   Temp 98.1 °F (36.7 °C)   Resp 16   SpO2 100%   GENERAL APPEARANCE: Awake and alert. Cooperative. No acute distress  HEAD: Normocephalic. Atraumatic. EYES: PERRL. EOM's grossly intact. ENT: Mucous membranes are moist.  Airway patent, no stridor  NECK: Supple. No rigidity  HEART: RRR. No murmurs  LUNGS: Respirations nonlabored. Lungs are clear to auscultation bilaterally. 37-year-old male with abdominal pain, he believes he had a bowel obstruction, benign abdominal exam on arrival with only mild tenderness in lower abdomen, vitals normal except mildly elevated blood pressure, initiated fluids and meds for pain/nausea given his SBO history, made n.p.o., and he did get some relief from Toradol/Phenergan, and in effort to see the transition point better- I asked if he could tolerate oral contrast and he stated he could, however after the CT he developed more abdominal pain, and there was a large amount of contrast noted in the stomach on CT so I did feel it appropriate to initiate NG tube at this time to help relieve his pain though he was not actively vomiting, he requested lidocaine for placement of the NG tube, he stated he did not really like the way the morphine made him feel after that was given, and requested more Toradol if necessary, placed on maintenance fluids, he was updated on CT findings with similar SBO findings from 4/17/2020 with transition point at the terminal ileum/ileocecal valve, labs unremarkable, I did speak to Dr. Ashley Nurse, on-call with general surgery and he agreed to accept for admission, stated he would potentially go for surgery this week, patient verbalized understanding of plan for admission.     Orders Placed This Encounter   Procedures    CT ABDOMEN PELVIS W IV CONTRAST Additional Contrast? IV AND ORAL    XR ABDOMEN FOR NG/OG/NE TUBE PLACEMENT    CBC Auto Differential    Comprehensive Metabolic Panel w/ Reflex to MG    Lactic Acid, Plasma    Lipase    Urinalysis Reflex to Culture    Diet NPO Effective Now    Nasogastric tube insertion    Inpatient consult to General Surgery    PATIENT STATUS (FROM ED OR OR/PROCEDURAL) Inpatient     Orders Placed This Encounter   Medications    0.9 % sodium chloride bolus    promethazine (PHENERGAN) injection 12.5 mg    ketorolac (TORADOL) injection 15 mg  DISCONTD: iohexol (OMNIPAQUE 240) injection 150 mL    iohexol (OMNIPAQUE 240) injection 50 mL    iopamidol (ISOVUE-370) 76 % injection 75 mL    morphine sulfate (PF) injection 4 mg    ondansetron (ZOFRAN) injection 4 mg    lactated ringers infusion 1,000 mL    lidocaine viscous hcl (XYLOCAINE) 2 % solution 15 mL     ED Course as of Jan 30 1020   Sat Jan 30, 2021   9529 I spoke to Dr. Thanh Menjivar, he agreed to admit, will likely take to surgery this week. [SY]   1017 NGT advanced by nurse. XR ABDOMEN FOR NG/OG/NE TUBE PLACEMENT [SY]   8950 NG tube was advanced by nurse    [SY]   6158 I updated the patient on plan, explained general surgery would be seeing him at some point today and would determine future plan of care. [SY]      ED Course User Index  [SY] Otto Solo DO       CLINICAL IMPRESSION  1. SBO (small bowel obstruction) ([facility] [address]    2. Lower abdominal pain    3. Nausea        Blood pressure 129/88, pulse 73, temperature 98.1 °F (36.7 °C), resp. rate 16, SpO2 100 %. [address] was admitted in stable condition.                         Otto Solo,   01/30/21 850 [facility], DO  01/30/21 7811

## 2021-01-30 NOTE — DISCHARGE INSTR - COC
Continuity of Care Form    Patient Name: Gustavo Harrell   :  1980  MRN:  [id number]    Admit date:  2021  Discharge date:  2021    Code Status Order: Prior   Advance Directives:     Admitting Physician:  No admitting provider for patient encounter. PCP: Hermila Montesinos MD    Discharging Nurse: [place] Unit/Room#:   Discharging Unit Phone Number: [phone]    Emergency Contact:   Extended Emergency Contact Information  Primary Emergency Contact: Donna Ojeda  Address: [address] [place] [address], [address] [place] [address]  Home Phone: [phone]  Work Phone: [phone]  Mobile Phone: [phone]  Relation: Parent  Secondary Emergency Contact: Missy Romberg  Mobile Phone: [phone]  Relation: Domestic Partner   needed? No    Past Surgical History:  Past Surgical History:   Procedure Laterality Date    ABDOMEN SURGERY      SHOULDER ARTHROPLASTY Left 2015    STOMACH SURGERY      TONSILLECTOMY      WISDOM TOOTH EXTRACTION         Immunization History: There is no immunization history on file for this patient.     Active Problems:  Patient Active Problem List   Diagnosis Code    Small bowel obstruction (HCC) K56.609    Shoulder instability M25.319    Labral tear of shoulder S43.439A    Closed Hill-Sachs fracture of left humerus S42.292A    S/P arthroscopy of shoulder Z98.890       Isolation/Infection:   Isolation          No Isolation        Patient Infection Status     None to display          Nurse Assessment:  Last Vital Signs: /73   Pulse 73   Temp 98.1 °F (36.7 °C)   Resp 16   SpO2 100%     Last documented pain score (0-10 scale): Pain Level: 7  Last Weight:   Wt Readings from Last 1 Encounters:   20 188 lb 9.6 oz (85.5 kg)     Mental Status:  oriented and alert    IV Access:  - None    Nursing Mobility/ADLs:  Walking   Independent  Transfer  Independent  Uriah Hanna Feeding  Independent  Med Admin  Independent  Med Delivery   whole    Wound Care Documentation and Therapy:        Elimination:  Continence:   · Bowel: Yes  · Bladder: Yes  Urinary Catheter: None   Colostomy/Ileostomy/Ileal Conduit: No         No intake or output data in the 24 hours ending 01/30/21 0850  No intake/output data recorded. Safety Concerns:     None    Impairments/Disabilities:      None    Nutrition Therapy:  Current Nutrition Therapy:   - Oral Diet:  Full Liquid advance in two to three days    Routes of Feeding: Oral  Liquids: Thin Liquids  Daily Fluid Restriction: no  Last Modified Barium Swallow with Video (Video Swallowing Test): not done    Treatments at the Time of Hospital Discharge:   Respiratory Treatments:   Oxygen Therapy:  is not on home oxygen therapy. Ventilator:    - No ventilator support    Rehab Therapies:   Weight Bearing Status/Restrictions: No weight bearing restirctions  Other Medical Equipment (for information only, NOT a DME order):     Other Treatments:     Patient's personal belongings (please select all that are sent with patient):  None    RN SIGNATURE:  Electronically signed by David Rhodes RN on 2/2/21 at 1:18 PM EST    CASE MANAGEMENT/SOCIAL WORK SECTION    Inpatient Status Date: ***    Readmission Risk Assessment Score:  Readmission Risk              Risk of Unplanned Readmission:        0           Discharging to Facility/ Agency   · Name:   · Address:  · Phone:  · Fax:    Dialysis Facility (if applicable)   · Name:  · Address:  · Dialysis Schedule:  · Phone:  · Fax:    / signature: {Esignature:[id number]}    PHYSICIAN SECTION    Prognosis: {Prognosis:8938188288}    Condition at Discharge: Garcia Jaime Leonel Patient Condition:483465519}    Rehab Potential (if transferring to Rehab): {Prognosis:6478571638}    Recommended Labs or Other Treatments After Discharge: *** Physician Certification: I certify the above information and transfer of Neetu De La Garza  is necessary for the continuing treatment of the diagnosis listed and that he requires {Admit to Appropriate Level of Care:34135} for {GREATER/LESS:964072074} 30 days.      Update Admission H&P: {CHP DME Changes in YRRNE:252952001}    PHYSICIAN SIGNATURE:  {Esignature:780659966}

## 2021-01-30 NOTE — ED NOTES
7502 - [facility]     [facility]  01/30/21 7142 [id number] - Dr. Tran Garcia called back.      [facility]  01/30/21 3007

## 2021-01-30 NOTE — ED NOTES
Report given to Nancy VALDERRAMA. Nancy transporting pt to floor via wheelchair.      Noble Foster RN  01/30/21 2025

## 2021-01-31 ENCOUNTER — APPOINTMENT (OUTPATIENT)
Dept: GENERAL RADIOLOGY | Age: 41
DRG: 390 | End: 2021-01-31
Payer: COMMERCIAL

## 2021-01-31 LAB
ANION GAP SERPL CALCULATED.3IONS-SCNC: 5 MMOL/L (ref 3–16)
BASOPHILS ABSOLUTE: 0 K/UL (ref 0–0.2)
BASOPHILS RELATIVE PERCENT: 0.6 %
BUN BLDV-MCNC: 10 MG/DL (ref 7–20)
CALCIUM SERPL-MCNC: 8.7 MG/DL (ref 8.3–10.6)
CHLORIDE BLD-SCNC: 109 MMOL/L (ref 99–110)
CO2: 26 MMOL/L (ref 21–32)
CREAT SERPL-MCNC: 1 MG/DL (ref 0.9–1.3)
EOSINOPHILS ABSOLUTE: 0.1 K/UL (ref 0–0.6)
EOSINOPHILS RELATIVE PERCENT: 1.3 %
GFR AFRICAN AMERICAN: >60
GFR NON-AFRICAN AMERICAN: >60
GLUCOSE BLD-MCNC: 100 MG/DL (ref 70–99)
HCT VFR BLD CALC: 40.4 % (ref 40.5–52.5)
HEMOGLOBIN: 13.4 G/DL (ref 13.5–17.5)
LACTIC ACID: 0.8 MMOL/L (ref 0.4–2)
LYMPHOCYTES ABSOLUTE: 1.4 K/UL (ref 1–5.1)
LYMPHOCYTES RELATIVE PERCENT: 28.5 %
MCH RBC QN AUTO: 31.4 PG (ref 26–34)
MCHC RBC AUTO-ENTMCNC: 33.3 G/DL (ref 31–36)
MCV RBC AUTO: 94.1 FL (ref 80–100)
MONOCYTES ABSOLUTE: 0.4 K/UL (ref 0–1.3)
MONOCYTES RELATIVE PERCENT: 7.9 %
NEUTROPHILS ABSOLUTE: 3.1 K/UL (ref 1.7–7.7)
NEUTROPHILS RELATIVE PERCENT: 61.7 %
PDW BLD-RTO: 13.2 % (ref 12.4–15.4)
PLATELET # BLD: 125 K/UL (ref 135–450)
PMV BLD AUTO: 9.1 FL (ref 5–10.5)
POTASSIUM SERPL-SCNC: 3.9 MMOL/L (ref 3.5–5.1)
RBC # BLD: 4.29 M/UL (ref 4.2–5.9)
SODIUM BLD-SCNC: 140 MMOL/L (ref 136–145)
WBC # BLD: 5 K/UL (ref 4–11)

## 2021-01-31 PROCEDURE — 36415 COLL VENOUS BLD VENIPUNCTURE: CPT

## 2021-01-31 PROCEDURE — 80048 BASIC METABOLIC PNL TOTAL CA: CPT

## 2021-01-31 PROCEDURE — 2580000003 HC RX 258: Performed by: SURGERY

## 2021-01-31 PROCEDURE — 6370000000 HC RX 637 (ALT 250 FOR IP): Performed by: SURGERY

## 2021-01-31 PROCEDURE — 83605 ASSAY OF LACTIC ACID: CPT

## 2021-01-31 PROCEDURE — 1200000000 HC SEMI PRIVATE

## 2021-01-31 PROCEDURE — 6360000002 HC RX W HCPCS: Performed by: SURGERY

## 2021-01-31 PROCEDURE — 74019 RADEX ABDOMEN 2 VIEWS: CPT

## 2021-01-31 PROCEDURE — 99223 1ST HOSP IP/OBS HIGH 75: CPT | Performed by: SURGERY

## 2021-01-31 PROCEDURE — 85025 COMPLETE CBC W/AUTO DIFF WBC: CPT

## 2021-01-31 RX ADMIN — KETOROLAC TROMETHAMINE 30 MG: 30 INJECTION, SOLUTION INTRAMUSCULAR; INTRAVENOUS at 12:12

## 2021-01-31 RX ADMIN — BENZOCAINE AND MENTHOL 1 LOZENGE: 15; 2.6 LOZENGE ORAL at 19:01

## 2021-01-31 RX ADMIN — BENZOCAINE AND MENTHOL 1 LOZENGE: 15; 2.6 LOZENGE ORAL at 15:30

## 2021-01-31 RX ADMIN — SODIUM CHLORIDE: 9 INJECTION, SOLUTION INTRAVENOUS at 09:31

## 2021-01-31 RX ADMIN — BENZOCAINE AND MENTHOL 1 LOZENGE: 15; 2.6 LOZENGE ORAL at 12:03

## 2021-01-31 RX ADMIN — BENZOCAINE AND MENTHOL 1 LOZENGE: 15; 2.6 LOZENGE ORAL at 20:50

## 2021-01-31 RX ADMIN — SODIUM CHLORIDE: 9 INJECTION, SOLUTION INTRAVENOUS at 19:01

## 2021-01-31 RX ADMIN — BENZOCAINE AND MENTHOL 1 LOZENGE: 15; 2.6 LOZENGE ORAL at 09:31

## 2021-01-31 RX ADMIN — BENZOCAINE AND MENTHOL 1 LOZENGE: 15; 2.6 LOZENGE ORAL at 13:12

## 2021-01-31 RX ADMIN — Medication 1 SPRAY: at 09:31

## 2021-01-31 RX ADMIN — SODIUM CHLORIDE: 9 INJECTION, SOLUTION INTRAVENOUS at 01:40

## 2021-01-31 ASSESSMENT — PAIN DESCRIPTION - DESCRIPTORS: DESCRIPTORS: HEADACHE

## 2021-01-31 NOTE — PROGRESS NOTES
Pt requesting to \"see a Dr, this pain is bad, something isn't right, it shouldn't feel like this\". Writer sent message to Dr. Debbie Boudreaux. I just gave Toradol to pt, pt requesting to see a dr r/t \"something is not right this is sharp stabbing pain and it shouldn't be like this\". would you like to order an xray? Thanks. Spoke with Dr. Regina Preston md to order xray. Bedside report and pt care transferred to [facility]. Jennifer Knutson RN

## 2021-01-31 NOTE — PROGRESS NOTES
Pt resting. Resp e/e. Shift assessment completed and charted. Pt wanting IVF rate increased, updated pt this RN would update MD, pt verbalized understanding. No other needs. Will monitor.  Gokul Paez

## 2021-01-31 NOTE — PROGRESS NOTES
Pt requesting pain medication but doesn't want dilaudid, pt prefers, \" toradol\", \"don't like how the morphine made me feel\". Writer placed call to Dr. Pk Berrios to request different pain meds. Kyle Davis RN\

## 2021-01-31 NOTE — PROGRESS NOTES
Spoke with Dr. Robb Carson regarding pt wanting IVF rate increased. Per Dr. Robb Carson, ok to increase IVF to 150 ml/hr for 8 hours then decrease back to 125 ml/hr.  Genevieve Jimenez

## 2021-01-31 NOTE — PLAN OF CARE
Problem: Activity:  Goal: Risk for activity intolerance will decrease  Description: Risk for activity intolerance will decrease  Outcome: Ongoing     Problem:  Bowel/Gastric:  Goal: Bowel function will improve  Description: Bowel function will improve  Outcome: Ongoing  Goal: Diagnostic test results will improve  Description: Diagnostic test results will improve  Outcome: Ongoing  Goal: Occurrences of nausea will decrease  Description: Occurrences of nausea will decrease  Outcome: Ongoing  Goal: Occurrences of vomiting will decrease  Description: Occurrences of vomiting will decrease  Outcome: Ongoing     Problem: Fluid Volume:  Goal: Maintenance of adequate hydration will improve  Description: Maintenance of adequate hydration will improve  Outcome: Ongoing     Problem: Health Behavior:  Goal: Ability to state signs and symptoms to report to health care provider will improve  Description: Ability to state signs and symptoms to report to health care provider will improve  Outcome: Ongoing     Problem: Physical Regulation:  Goal: Complications related to the disease process, condition or treatment will be avoided or minimized  Description: Complications related to the disease process, condition or treatment will be avoided or minimized  Outcome: Ongoing  Goal: Ability to maintain clinical measurements within normal limits will improve  Description: Ability to maintain clinical measurements within normal limits will improve  Outcome: Ongoing     Problem: Sensory:  Goal: Ability to identify factors that increase the pain will improve  Description: Ability to identify factors that increase the pain will improve  Outcome: Ongoing  Goal: Ability to notify healthcare provider of pain before it becomes unmanageable or unbearable will improve  Description: Ability to notify healthcare provider of pain before it becomes unmanageable or unbearable will improve  Outcome: Ongoing  Goal: Pain level will decrease Description: Pain level will decrease  Outcome: Ongoing     Problem: Pain:  Description: Pain management should include both nonpharmacologic and pharmacologic interventions.   Goal: Pain level will decrease  Description: Pain level will decrease  Outcome: Ongoing  Goal: Control of acute pain  Description: Control of acute pain  Outcome: Ongoing  Goal: Control of chronic pain  Description: Control of chronic pain  Outcome: Ongoing

## 2021-02-01 PROCEDURE — 1200000000 HC SEMI PRIVATE

## 2021-02-01 PROCEDURE — 2580000003 HC RX 258: Performed by: SURGERY

## 2021-02-01 PROCEDURE — 6370000000 HC RX 637 (ALT 250 FOR IP): Performed by: SURGERY

## 2021-02-01 PROCEDURE — 6360000002 HC RX W HCPCS: Performed by: SURGERY

## 2021-02-01 PROCEDURE — 99232 SBSQ HOSP IP/OBS MODERATE 35: CPT | Performed by: SURGERY

## 2021-02-01 RX ADMIN — BENZOCAINE AND MENTHOL 1 LOZENGE: 15; 2.6 LOZENGE ORAL at 15:02

## 2021-02-01 RX ADMIN — KETOROLAC TROMETHAMINE 30 MG: 30 INJECTION, SOLUTION INTRAMUSCULAR; INTRAVENOUS at 18:11

## 2021-02-01 RX ADMIN — KETOROLAC TROMETHAMINE 30 MG: 30 INJECTION, SOLUTION INTRAMUSCULAR; INTRAVENOUS at 01:22

## 2021-02-01 RX ADMIN — BENZOCAINE AND MENTHOL 1 LOZENGE: 15; 2.6 LOZENGE ORAL at 11:08

## 2021-02-01 RX ADMIN — BENZOCAINE AND MENTHOL 1 LOZENGE: 15; 2.6 LOZENGE ORAL at 08:54

## 2021-02-01 RX ADMIN — SODIUM CHLORIDE: 9 INJECTION, SOLUTION INTRAVENOUS at 18:11

## 2021-02-01 RX ADMIN — KETOROLAC TROMETHAMINE 30 MG: 30 INJECTION, SOLUTION INTRAMUSCULAR; INTRAVENOUS at 10:29

## 2021-02-01 RX ADMIN — SODIUM CHLORIDE: 9 INJECTION, SOLUTION INTRAVENOUS at 08:54

## 2021-02-01 RX ADMIN — BENZOCAINE AND MENTHOL 1 LOZENGE: 15; 2.6 LOZENGE ORAL at 18:11

## 2021-02-01 ASSESSMENT — PAIN - FUNCTIONAL ASSESSMENT: PAIN_FUNCTIONAL_ASSESSMENT: PREVENTS OR INTERFERES SOME ACTIVE ACTIVITIES AND ADLS

## 2021-02-01 ASSESSMENT — PAIN DESCRIPTION - ONSET
ONSET: ON-GOING
ONSET: ON-GOING

## 2021-02-01 ASSESSMENT — PAIN DESCRIPTION - ORIENTATION: ORIENTATION: RIGHT;LOWER

## 2021-02-01 ASSESSMENT — PAIN DESCRIPTION - LOCATION
LOCATION: ABDOMEN
LOCATION: ABDOMEN

## 2021-02-01 ASSESSMENT — PAIN DESCRIPTION - DESCRIPTORS
DESCRIPTORS: SORE
DESCRIPTORS: SORE

## 2021-02-01 ASSESSMENT — PAIN SCALES - GENERAL
PAINLEVEL_OUTOF10: 4
PAINLEVEL_OUTOF10: 4

## 2021-02-01 ASSESSMENT — PAIN DESCRIPTION - FREQUENCY
FREQUENCY: INTERMITTENT
FREQUENCY: INTERMITTENT

## 2021-02-01 NOTE — PROGRESS NOTES
Cepacol lozenge provided to patient for report of sore throat. I also provided patient with a some ice chips.

## 2021-02-01 NOTE — PROGRESS NOTES
02/01/21 0844   Vital Signs   Temp 98.7 °F (37.1 °C)   Temp Source Oral   Pulse 75   Heart Rate Source Monitor   Resp 18   BP (!) 148/81   BP Location Left upper arm   Patient Position Semi fowlers   Level of Consciousness Alert (0)   MEWS Score 1   Patient Currently in Pain Denies  (\"I'm alright right now. It's a zero\")   Pain Assessment   Pain Assessment 0-10   Pain Level 0   Oxygen Therapy   SpO2 99 %   O2 Device None (Room air)     Vitals this morning as above. Assessment completed, see flowsheets. Patient refused his pepcid this morning as well as his lovenox, patient advised of importance of medications and education provided on their use, and patient verbalized understanding but continued to refuse. Patient requested Cepacol lozenge, and this was provided per PRN order. Patient with no signs or symptoms of distress and patient was assisted with ambulation to the restroom. Patient denies other needs, has call light within reach.

## 2021-02-01 NOTE — PROGRESS NOTES
Pt requesting pain medication  4/10. PRN  pain medication given, see MAR. SR up x2, Call light and bedside table in easy reach. Denies any other needs at this time.

## 2021-02-01 NOTE — PLAN OF CARE
2/1/2021 1212 by Tessy Tay RN  Outcome: Ongoing  2/1/2021 0528 by Mariana Cartagena RN  Outcome: Ongoing  Goal: Ability to maintain clinical measurements within normal limits will improve  Description: Ability to maintain clinical measurements within normal limits will improve  2/1/2021 1212 by Tessy Tay RN  Outcome: Ongoing  2/1/2021 0528 by Mariana Cartagena RN  Outcome: Ongoing     Problem: Sensory:  Goal: Ability to identify factors that increase the pain will improve  Description: Ability to identify factors that increase the pain will improve  2/1/2021 1212 by Tessy Tay RN  Outcome: Ongoing  2/1/2021 0528 by Mariana Cartagena RN  Outcome: Ongoing  Goal: Ability to notify healthcare provider of pain before it becomes unmanageable or unbearable will improve  Description: Ability to notify healthcare provider of pain before it becomes unmanageable or unbearable will improve  2/1/2021 1212 by Tessy Tay RN  Outcome: Ongoing  2/1/2021 0528 by Mariana Cartagena RN  Outcome: Ongoing  Goal: Pain level will decrease  Description: Pain level will decrease  2/1/2021 1212 by Tessy Tay RN  Outcome: Ongoing  2/1/2021 0528 by Mariana Cartagena RN  Outcome: Ongoing     Problem: Pain:  Goal: Pain level will decrease  Description: Pain level will decrease  2/1/2021 1212 by Tessy Tay RN  Outcome: Ongoing  2/1/2021 0528 by Mariana Cartagena RN  Outcome: Ongoing  Goal: Control of acute pain  Description: Control of acute pain  2/1/2021 1212 by Tessy Tay RN  Outcome: Ongoing  2/1/2021 0528 by Mariana Cartagena RN  Outcome: Ongoing  Goal: Control of chronic pain  Description: Control of chronic pain  2/1/2021 1212 by Tessy Tay RN  Outcome: Ongoing  2/1/2021 0528 by Mariana Cartagena RN  Outcome: Ongoing

## 2021-02-01 NOTE — PROGRESS NOTES
Shift assessment complete; see flow sheet. Scheduled medications administered; See MAR. IV infusing without difficulty. Pt denies pain at this time. Pt denies any needs at this time.  Pt educated on use of call light and to call out with needs, verbalized understanding, bed in low locked position for pt safety

## 2021-02-01 NOTE — PROGRESS NOTES
General Surgery [facility], [address] [place] [address]  Daily Progress Note    Pt Name: Rodolfo Haque [facility] Record Number: [id number]  Date of Birth 1980   Today's Date: 2/1/2021    ASSESSMENT  1. ABD xray 1/31: persistently dilated SB with mild improvement, contrast in the colon   2. CT 1/30: SBO with transition in the TI/ileocecal valve. Similar to 4/17/20   3. ABD: soft, non-tender, no N/V with his NGT in place, + flatus, no BM  4. NGT 1.2L   5. Labs unremarkable  6. VSS     PLAN  1. IVF  2. Clamp NGT and trial clears  3. Repeat ABD xray in the am.   4. Dr. Rae Rider to see patient in the am. Pt with resolving SBO. Jewell Cevallos has slightly improved from yesterday. Pain is well controlled. He has no nausea, nausea and no vomiting with his NGT in place. He has passed flatus and has not had a bowel movement. He is NPo with his NGT in place. Current activity is ad anne    OBJECTIVE  VITALS:  height is 6' (1.829 m) and weight is 191 lb (86.6 kg). His oral temperature is 99.1 °F (37.3 °C). His blood pressure is 137/85 and his pulse is 77. His respiration is 18 and oxygen saturation is 100%. VITALS:  /85   Pulse 77   Temp 99.1 °F (37.3 °C) (Oral)   Resp 18   Ht 6' (1.829 m)   Wt 191 lb (86.6 kg)   SpO2 100%   BMI 25.90 kg/m²   INTAKE/OUTPUT:    Intake/Output Summary (Last 24 hours) at 2/1/2021 1502  Last data filed at 2/1/2021 1404  Gross per 24 hour   Intake    Output 1500 ml   Net -1500 ml     GENERAL: alert, cooperative, no distress    I/O last 3 completed shifts:   In: 120 [P.O.:120]  Out: 1200 [Emesis/NG output:1200]  I/O this shift:  In: -   Out: 900 [Emesis/NG output:900]    LABS  Recent Labs     01/30/21  0434 01/30/21  0820 01/31/21  0648   WBC 4.8  --  5.0   HGB 14.9  --  13.4*   HCT 43.8  --  40.4*     --  125*     --  140   K 3.9  --  3.9   CL 99  --  109   CO2 30  --  26   BUN 11  --  10   CREATININE 1.1  --  1.0   CALCIUM 10.3  --  8.7   AST 22  --   --    ALT 18  --   -- BILITOT 0.4  --   --    NITRU  --  Negative  --    COLORU  --  Yellow  --      CBC with Differential:    Lab Results   Component Value Date    WBC 5.0 01/31/2021    RBC 4.29 01/31/2021    HGB 13.4 01/31/2021    HCT 40.4 01/31/2021     01/31/2021    MCV 94.1 01/31/2021    MCH 31.4 01/31/2021    MCHC 33.3 01/31/2021    RDW 13.2 01/31/2021    LYMPHOPCT 28.5 01/31/2021    MONOPCT 7.9 01/31/2021    BASOPCT 0.6 01/31/2021    MONOSABS 0.4 01/31/2021    LYMPHSABS 1.4 01/31/2021    EOSABS 0.1 01/31/2021    BASOSABS 0.0 01/31/2021     CMP:    Lab Results   Component Value Date     01/31/2021    K 3.9 01/31/2021    K 3.9 01/30/2021     01/31/2021    CO2 26 01/31/2021    BUN 10 01/31/2021    CREATININE 1.0 01/31/2021    GFRAA >60 01/31/2021    AGRATIO 1.8 01/30/2021    LABGLOM >60 01/31/2021    GLUCOSE 100 01/31/2021    PROT 7.6 01/30/2021    LABALBU 4.9 01/30/2021    CALCIUM 8.7 01/31/2021    BILITOT 0.4 01/30/2021    ALKPHOS 58 01/30/2021    AST 22 01/30/2021    ALT 18 01/30/2021         Idalia Govea [place] [employer]  Electronically signed 2/1/2021 at 2:49 PM     Patient seen and examined. I agree with the assessment and plan from RODERICK Reyes. Patient feels better. Third SBO in 15 months. He is frustrated. Abdomen benign. Clamp NG today and trial liquids. NPO after midnight. Dr. Natalie Aguirre to evaluate tomorrow to discuss possible laparoscopy as further evaluation of recurrent SBO.     [address] [facility]

## 2021-02-01 NOTE — PROGRESS NOTES
Cepacol given for NG tube discomfort. Toradol given for pain rating 4/10 in the abdomen per PRN orders.

## 2021-02-01 NOTE — CARE COORDINATION
Case Management Assessment  Initial Evaluation      Patient Name: Fabiola Caro  YOB: 1980  Diagnosis: SBO (small bowel obstruction) ([facility] [D64.506]  Date / Time: 1/30/2021  4:16 AM    Admission status/Date:1/30/21 inpt  Chart Reviewed: Yes      Patient Interviewed: Yes   Family Interviewed:  No      Hospitalization in the last 30 days:  No      Health Care Decision Maker :     (CM - must 1st enter selection under Navigator - emergency contact- Health Care Decision Maker Relationship and pick relationship)   Who do you trust or have selected to make healthcare decisions for you      Met with:  patient  Interview conducted  (bedside/phone):    Current PCP: [address] required for SNF : Y         3 night stay required -  [employer]  Support Systems/Care Needs:    Transportation: self    Meal Preparation: self    Housing  Living Arrangements: lives 2 story home  Steps: 10-15  Intent for return to present living arrangements: Yes  Identified Issues:     [address] with [address] : No Agency:(Services)     Passport/Waiver : No  :                      Phone Number:    Passport/Waiver Services: N/A          Durable Medical Equiptment   DME Provider: N/A  Equipment: N/A  Walker___Cane___RTS___ BSC___Shower Chair___Hospital Bed___W/C____Other________  02 at ____Liter(s)---wears(frequency)_______ HHN ___ CPAP___ BiPap___   N/A_x___      Home O2 Use :  No    If No for home O2---if presently on O2 during hospitalization:  No  if yes CM to follow for potential DC O2 need  Informed of need for care provider to bring portable home O2 tank on day of discharge for nursing to connect prior to leaving:   Not Indicated  Verbalized agreement/Understanding:   Not Indicated    Community Service Affiliation  Dialysis:  No    · Agency:  · Location:  · Dialysis Schedule:  · Phone:   · Fax: Other Community Services: (ex:PT/OT,Mental Health,Wound Clinic, Cardio/Pul [address]) None    DISCHARGE PLAN: Explained Case Management role/services. Reviewed chart and met with pt t bedside. Role of CM explained. States lives in 2 story home with Girlfriend. Plan is to return at MD. States typically IPTA. Denies needs. Will follow as pt may need surgical intervention.

## 2021-02-02 ENCOUNTER — APPOINTMENT (OUTPATIENT)
Dept: GENERAL RADIOLOGY | Age: 41
DRG: 390 | End: 2021-02-02
Payer: COMMERCIAL

## 2021-02-02 VITALS
DIASTOLIC BLOOD PRESSURE: 77 MMHG | WEIGHT: 191 LBS | SYSTOLIC BLOOD PRESSURE: 127 MMHG | TEMPERATURE: 98.6 F | HEART RATE: 66 BPM | OXYGEN SATURATION: 98 % | BODY MASS INDEX: 25.87 KG/M2 | RESPIRATION RATE: 16 BRPM | HEIGHT: 72 IN

## 2021-02-02 LAB — SARS-COV-2, NAAT: NOT DETECTED

## 2021-02-02 PROCEDURE — U0002 COVID-19 LAB TEST NON-CDC: HCPCS

## 2021-02-02 PROCEDURE — 74019 RADEX ABDOMEN 2 VIEWS: CPT

## 2021-02-02 PROCEDURE — 99238 HOSP IP/OBS DSCHRG MGMT 30/<: CPT | Performed by: SURGERY

## 2021-02-02 NOTE — PROGRESS NOTES
Patient ate lunch and denies any nausea. Contacting Idalia NP for possible discharge and NG removal. Patient requesting NG to be in place for 30 minutes.

## 2021-02-02 NOTE — PROGRESS NOTES
NG tube removed with no complications. Patient being discharged to home. Instructed to continue full liquid diet today and advance diet over the next 2-3 days. Patient in stable condition.

## 2021-02-02 NOTE — DISCHARGE INSTR - DIET
? Good nutrition is important when healing from an illness, injury, or surgery. Follow any nutrition recommendations given to you during your hospital stay. ? If you were given an oral nutrition supplement while in the hospital, continue to take this supplement at home. You can take it with meals, in-between meals, and/or before bedtime. These supplements can be purchased at most local grocery stores, pharmacies, and chain [employer]-stores. ? If you have any questions about your diet or nutrition, call the hospital and ask for the dietitian. Full liquid diet for today.  Advance diet over the next 2-3 days as tolerated

## 2021-02-02 NOTE — PROGRESS NOTES
Handoff report and transfer of care given at bedside to [facility]. Patient in stable condition, denies needs/concerns at this time. Call light within reach.

## 2021-02-02 NOTE — PROGRESS NOTES
Shift assessment complete; see flow sheet. Scheduled medications administered; See MAR. IV infusing without difficulty. Pt denies pain at this time. Ng Clamped at this time per MD request. NG at 68cm. Pt denies any needs at this time.  Pt educated on use of call light and to call out with needs, verbalized understanding, bed in low locked position for pt safety

## 2021-02-02 NOTE — CARE COORDINATION
DISCHARGE ORDER  Date/Time 2021 1:57 PM  Completed by: Bebeto Rosenberg, Case Management    Patient Name: Ganesh Wang      : 1980  Admitting Diagnosis: SBO (small bowel obstruction) ([facility] [P69.541]      Admit order Date and Status: 21 inpt  (verify MD's last order for status of admission)      Noted discharge order. If applicable PT/OT recommendation at Discharge: N/A  DME recommendation by PT/OT:N/a  Confirmed discharge plan  : Yes  with whom patient  If pt confirmed DC plan does family need to be contacted by CM No   Discharge Plan: Order for dc noted. Reviewed chart and no dc needs identified. Reviewed chart. Role of discharge planner explained and patient verbalized understanding. Discharge order is noted. Has Home O2 in place on admit:  No  Informed of need to bring portable home O2 tank on day of discharge for nursing to connect prior to leaving:   Not Indicated  Verbalized agreement/Understanding:   Not Indicated  Pt is being d/c'd to home today. Pt's O2 sats are 98% on RA. Discharge timeout done with  Nsg, CM and pt. All discharge needs and concerns addressed.

## 2021-02-02 NOTE — DISCHARGE SUMMARY
GENERAL SURGERY  Discharge Summary      Pt Name:Brennon Weber  MRN: [id number]  YOB: 1980  Primary Care Physician: Stephanie Mejias MD  Admit date:  1/30/2021  4:16 AM  Discharge date:  No discharge date for patient encounter. Disposition: home  Admitting Diagnosis:   1. SBO (small bowel obstruction) ([facility]    2. Lower abdominal pain    3. Nausea      Discharge Diagnosis:   Patient Active Problem List   Diagnosis Code    Small bowel obstruction (HCC) K56.609    Shoulder instability M25.319    Labral tear of shoulder S43.439A    Closed Hill-Sachs fracture of left humerus S42.292A    S/P arthroscopy of shoulder Z98.890    SBO (small bowel obstruction) ([facility] K56.609     Consultants:  none  Procedures/Diagnostic Test:  CT abdomen and pelvis; F/U abdominal xrays. Hospital Course: Tuan Harvey originally presented to the hospital on 1/30/2021  4:16 AM with reports of abdominal pain and nausea. The patient is well known to our group. In the pat, the patient has been admitted for bowel obstructions which have resolved with conservative treatment. In the ER, a CT was performed and demonstrated a small bowel obstruction with a transition noted at the terminal ileum similar to previous images. The patient was admitted for nasogastric decompression, intravenous fluids, intravenous pain medications and serial abdominal examinations. The patient was requesting surgical intervention for his recurrent small bowel obstructions. All the risks, benefits and alternatives were discussed between Dr. Abdon Felix and Mr. David Ruiz. The patient declined surgical intervention at this time. The patient's follow-up abdominal x-rays improved. He was passing flatus. He was tolerating a liquid diet. He was requesting to go home. As result, he was cleared for discharge to home in stable condition. Felipe Caputo At time of discharge, Ashleigh Guerra was tolerating a full liquid diet, passing flatus, ambulating on his own accord and required no pain medicaitons, and had no signs of symptoms of complications. PHYSICAL EXAMINATION   Discharge Vitals:  height is 6' (1.829 m) and weight is 191 lb (86.6 kg). His oral temperature is 98.6 °F (37 °C). His blood pressure is 127/77 and his pulse is 66. His respiration is 16 and oxygen saturation is 98%. General appearance - alert, well appearing, and in no distress  Abdomen - soft, nontender, nondistended, no masses or organomegaly  Neurological - motor and sensory grossly normal bilaterally  Musculoskeletal - full range of motion without pain  Incision: N/A    LABS     Recent Labs     01/31/21  0648   WBC 5.0   HGB 13.4*   HCT 40.4*   *      K 3.9      CO2 26   BUN 10   CREATININE 1.0       DISCHARGE INSTRUCTIONS   Discharge Medications:      Medication List      You have not been prescribed any medications. Diet: Home on fulls then may advance diet as tolerated  Activity: activity as tolerated  Wound Care: N/A  Follow-up:  PRN with Sonia Renteria.   Time Spent for discharge: 45 minutes      Electronically signed by JOHN Jones CNP on 2/2/2021 at 1:19 PM

## 2021-02-02 NOTE — PROGRESS NOTES
Patient refused Surgery at this time. Doctor Myra advanced diet to full liquid. If patient tolerates lunch patient may be discharged to home.